# Patient Record
Sex: MALE | Race: WHITE | NOT HISPANIC OR LATINO | Employment: FULL TIME | URBAN - NONMETROPOLITAN AREA
[De-identification: names, ages, dates, MRNs, and addresses within clinical notes are randomized per-mention and may not be internally consistent; named-entity substitution may affect disease eponyms.]

---

## 2017-04-06 ENCOUNTER — HOSPITAL ENCOUNTER (OUTPATIENT)
Dept: RADIOLOGY | Facility: OTHER | Age: 37
End: 2017-04-06
Attending: FAMILY MEDICINE

## 2017-04-06 ENCOUNTER — HISTORY (OUTPATIENT)
Dept: FAMILY MEDICINE | Facility: OTHER | Age: 37
End: 2017-04-06

## 2017-04-06 ENCOUNTER — OFFICE VISIT - GICH (OUTPATIENT)
Dept: FAMILY MEDICINE | Facility: OTHER | Age: 37
End: 2017-04-06

## 2017-04-06 DIAGNOSIS — M54.50 LOW BACK PAIN: ICD-10-CM

## 2017-04-06 DIAGNOSIS — G89.29 OTHER CHRONIC PAIN: ICD-10-CM

## 2017-04-06 DIAGNOSIS — M62.830 MUSCLE SPASM OF BACK: ICD-10-CM

## 2017-04-06 LAB
ABSOLUTE BASOPHILS - HISTORICAL: 0.1 THOU/CU MM
ABSOLUTE EOSINOPHILS - HISTORICAL: 0.1 THOU/CU MM
ABSOLUTE LYMPHOCYTES - HISTORICAL: 2.9 THOU/CU MM (ref 0.9–2.9)
ABSOLUTE MONOCYTES - HISTORICAL: 0.6 THOU/CU MM
ABSOLUTE NEUTROPHILS - HISTORICAL: 3.8 THOU/CU MM (ref 1.7–7)
BASOPHILS # BLD AUTO: 1.2 %
EOSINOPHIL NFR BLD AUTO: 1.6 %
ERYTHROCYTE [DISTWIDTH] IN BLOOD BY AUTOMATED COUNT: 11.1 % (ref 11.5–15.5)
ERYTHROCYTE [SEDIMENTATION RATE] IN BLOOD: 2 MM/HR
HCT VFR BLD AUTO: 44.7 % (ref 37–53)
HEMOGLOBIN: 15.5 G/DL (ref 13.5–17.5)
LYMPHOCYTES NFR BLD AUTO: 38.6 % (ref 20–44)
MCH RBC QN AUTO: 33.3 PG (ref 26–34)
MCHC RBC AUTO-ENTMCNC: 34.6 G/DL (ref 32–36)
MCV RBC AUTO: 96 FL (ref 80–100)
MONOCYTES NFR BLD AUTO: 8 %
NEUTROPHILS NFR BLD AUTO: 50.6 % (ref 42–72)
PLATELET # BLD AUTO: 315 THOU/CU MM (ref 140–440)
PMV BLD: 8.3 FL (ref 6.5–11)
RED BLOOD COUNT - HISTORICAL: 4.65 MIL/CU MM (ref 4.3–5.9)
WHITE BLOOD COUNT - HISTORICAL: 7.6 THOU/CU MM (ref 4.5–11)

## 2017-04-10 LAB
ANA INTERPRETATION: POSITIVE
Lab: ABNORMAL
SPECIMEN STATUS - HISTORICAL: ABNORMAL

## 2017-04-19 ENCOUNTER — HOSPITAL ENCOUNTER (OUTPATIENT)
Dept: PHYSICAL THERAPY | Facility: OTHER | Age: 37
Setting detail: THERAPIES SERIES
End: 2017-04-19
Attending: FAMILY MEDICINE

## 2017-04-19 DIAGNOSIS — M54.50 LOW BACK PAIN: ICD-10-CM

## 2017-04-19 DIAGNOSIS — G89.29 OTHER CHRONIC PAIN: ICD-10-CM

## 2017-12-30 NOTE — DISCHARGE SUMMARY
Patient Information     Patient Name MRN Sex Rangel Stanton [id number] Male 1980      Discharge Summaries by Soheila Mcneil PT at 2017 10:29 AM     Author:  Soheila Mcneil PT Service:  (none) Author Type:  PT- Physical Therapist     Filed:  2017 10:32 AM Date of Service:  2017 10:29 AM Status:  Signed     :  Soheila Mcneil PT (PT- Physical Therapist)            [facility]  Outpatient PT -   Discharge Note      Date of Service: 2017   Visit #1   Patient Name: Rangel Florez   YOB: 1980   Referring MD/Provider: Arielle Chen MD  Medical and Treatment Diagnosis: LBP  Treatment Diagnosis:  L LBP with muscle spasm  Insurance: BC  Start of Service: 17  Certification Dates: Start of Service: 17   Medicare/MA Re-Cert Due: 17    Living Situations:  Independent in Living Situation  Preadmission Functional Mobility: Independent  Precautions:  None  Cognition:  Oriented to Person, Place, and Time.    Were cultural / age or other special adaptations needed? No      Patient is a vulnerable adult: No      Patient is aware of diagnosis: Yes      Risks and benefits explained: Yes      Discharge Note:  Patient was doing well with therapy for LBP.  Patient has not come for any further PT due to called to cancel his follow up because he had to work, discussed HEP and LBP was already much improved, we were going to keep chart open in case of exacerbation but he didn't call back so will d/c at this time.  No G-codes were obtained because of no discharge visit.  Plan to discharge from PT at this time.  See below for any further details in note from last vitis on 17.          Subjective      History of Injury/reason for PT: Patient comes in with left sided LBP that only occurs first thing in the morning.  After 5 -10 minutes loosens up.  L muscle feels like a spasm.  Started about a month ago.  Sleeps on his back, discussed  adding a pillow under knees.  Mattress is old, also discussed getting a new bed.  Doesn't bother him if he gets up in the night, just first thing in the morning. Hasn't tried heat or cold with this episode but has in the past with limited help.  Just goes away with getting up and moving.  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  Saw chiro when he lived up in [place] with gentle adjustments it would usually go away.  Dr. Falcon does strong adjustments that have helped but sx return.  Did have sciatic episode in , chiropractor didn't help, got HEP from a PT and continues with extension stretches daily. Pain 3-4/10 discomfort during day no pain.  Stays busy at work and very active with yard work and remodeling his house and his  parent's house currently.     Symptoms at evaluation:  ?   Weakness - first 5-10 min  Pain Rating:    3-4/10  Pain Location:  L LB    Aggravation Factors: first thing in the morning    Easing Factors: moving    Occupation: forester   Regular duties include: walking woods, office, lift/ carry packs     Patient Specific Functional and Pain Scales (PSFS) completed 2017  Clinician Instructions: Complete after the history and before the exam.   Initial Assessment:   We want to know what 3 activities in your life you are unable to perform, or are having the most difficulty performing, as a result of your chief problem. Please list and score at least 3 activities that you are unable to perform, or having the most difficulty performing, because of your chief problem.   Patient Specific Activity Scoring Scheme (score one number for each activity):   Activity Score (0-10)  0= Unable to perform activity  10= Able to perform activity at same level as before injury or problem   1. Pain in morning (3-410) 6-7/10   2.  /10   3.  /10   4.  /10   5.  /10   Totals:  6-710 = 65% Function   and 35% Impairment   Patient verbally states that they understand that the  information they have provided above is current and complete to the best of their knowledge.     Initial Primary G Code and Modifier:    Per the Patient's intake and/or assessment the Primary G Code is: Mobility .   The Patient's Impairment, Limitation or Restriction Modifier would be best described as: CJ - 20% - 40% Impairment.   Goal Primary G Code and Modifier:    The Patient's G Code Goal would be: Mobility    The Patient's Impairment, Limitation or Restriction Modifier goal would be best described as: CI - 1% - 20% Impairment.       Prior Level of Function: No difficulty completing the above functional activities prior to onset.      Previous Injury / Surgery: none    Previous Treatment:    Pain Meds / Anti-inflammatory Meds    ? - m relaxors no help, IBP some  Chiropractic  Physical Therapy     Diagnostics:       X-Ray- at [facility]      MRI- yes- in 05 or 06 in [place]        Results: negative    Current Medications:   ? See chart of medications    Drug Allergies:  ? See chart for allergies  ?   Latex Allergy: See chart for allergies     No past medical history on file.  No past surgical history on file.    Additional Significant PMHX: Pacemaker no, DM no, healthy    Other: L sciatica    Objective    Items left blank indicate that the test was inappropriate or not meaningful at the time of evaluation and therefore not performed.    Fall Risk Screening:  No risk factors identified       Posture/Structural:   Hand Dominance:   __x___Right  _____Left   Head and Neck Position: fwd   Shoulders/scapulae: fwd   Lumbar lordosis: decreased    Gait: normal    ROM/Strength: Grade 5 equals normal   Cerv Thor Lumbar Myotomes    L    R     L    R   Flexion    C4 Shoulder Elev.   L2 Hip Flexion 5 5   Extension    C5 Shoulder Abd.   L3 Knee Ext.      5 5   Left Lat. Flex    C6 Elbow Flexion   L4 Ankle DF 5 5   Right Lat. Flex    C7 Elbow Ext.   L5 1st MTP Ext.     Left Rotation    C8 Finger Flex   S1 Ankle P.F.      Right Rotation    T1 Finger Abd.   S2 Knee Flexion 5 5          Hip Abduction 5 5          Hip Adduction 5 5          Hip Extension 5 5          Hip ER         Special Tests: SLR negative for nerve involvement but indicates very tight hamstrings B    Palpation: muscle tightness/spasm L lumbosacral paraspinals in standing and sitting, relaxes in prone    Today's Intervention:  Evaluation completed, started patient on HEP as following, educated on posture.     Home Exercise Program:  Access Code: [email or web address] URL: [email or web address] Date: 04/19/2017 Prepared by: Soheila Mcneil   Exercises   Seated Hamstring Stretch - 2 reps - 30 second hold - 2x daily   Standing Lumbar Extension - 5-10 reps - 2 hold - 2x daily   Supine Posterior Pelvic Tilt - 20 reps - 5 hold - 1x daily   Supine Pelvic Tilt with Straight Leg Raise - 20 reps - 1x daily   Swimming - 30 reps - 2 hold - 1x daily - start with just legs            Assessment    Response to Intervention:  Patient was able to demonstrate HEP properly today.  Patient felt he had a good workout without increased pain.      Therapist Assessment / Clinical Impression: Patient presents with signs and symptoms congruent with muscle strain with postural limitations and will benefit from skilled PT to increase ROM, strength, decrease pain, and improve function.      Functional Impairment(s):  See subjective on initial evaluation and Functional Assessment / Summary Report from PSFS.    Physical Impairment(s):       MUSCULOSKELETAL:  Loss of Motion, Muscle Tightness and Pain      Patient Goal (time reference required): Patient would like to be able to get up in the morning without pain less in 4 weeks.     Functional therapy goals:   Patient is to be independent in their Home Exercise Program in 3 weeks.  Patient is to demonstrate ability to sit and stand demonstrating good posture in 3 weeks.  Patient is report the ability to get up from sleep without pain in 6  weeks.  Patient is to self-manage symptoms and return to prior function in 6 weeks.      Patient participated in goal selection and understand(s) the plan of care: Yes   Patient Potential for Achieving Desired Outcome:  Excellent      Plan    Treatment Plan / Targeted Outcomes:     Frequency:   6 visits     Duration of Treatment: 6 weeks    Planned Interventions:  Possible physical therapy interventions include but are not limited to:   Home Exercise Program development  Therapeutic Exercise (ROM & Strengthening)  Manual Therapy  Neuromuscular Re-education  Ultrasound  Electrical Stimulation  Phonophoresis with Ketoprofen  Iontophoresis with Dexamethasone  Therapeutic Activities  Gait Training  Posture and Body Mechanics Education  Mechanical traction if indicated    Plan for next visit:  Advance core strength, add stretches as needed, modalities if needed.     Student or PTA has been instructed in and demonstrates skills necessary to carry out above stated treatment plan: Yes    Thank you for your referral to [facility].  Please call with any questions, concerns or comments.  [phone]  Soheila Mcneil DPT    The signature, of the referring medical provider, on this document indicates certification of the above prescribed plan of care and is medically necessary.

## 2018-01-04 NOTE — PROGRESS NOTES
"Patient Information     Patient Name MRN Sex Rangel Stanton [id number] Male 1980      Progress Notes by Arielle Chen DO at 2017  2:30 PM     Author:  Arielle Chen DO Service:  (none) Author Type:  PHYS- Osteopathic     Filed:  2017  9:27 AM Encounter Date:  2017 Status:  Signed     :  Arielle Chen DO (PHYS- Osteopathic)            SUBJECTIVE:  Rangel Florez is a 36 y.o. male who presents for recurrent back pain.    HPI  [place] is here for low L sided back pain without radiation.  Described as \"tight\" like a spasm.  Worse in the morning when he first wakes up - lasting for 30 minutes.  He can almost time it.  Okay throughout the day at work, but then after any periods of rest or even after stopping work for 10-15 minutes, he will feel it start in again.  Has old muscle relaxers from last summer and tried a couple at bedtime - only noticed a small improvement.  Works as a [employer].  + tobacco use - aware of need to quit. And reiterated today body's ability to heal and recover is blunted when tobacco is present.  Uncertain if there is any family history of autoimmune diseases or inflammatory arthritis.    No Known Allergies,   Current Outpatient Prescriptions on File Prior to Visit       Medication  Sig Dispense Refill     meloxicam 15 mg tablet Take 1 tablet by mouth once daily. 14 tablet 0     No current facility-administered medications on file prior to visit.      Patient Active Problem List     Diagnosis  Code     Chronic left-sided low back pain without sciatica M54.5, G89.29     REVIEW OF SYSTEMS:  Review of Systems   Constitutional: Negative for chills and fever.   Respiratory: Negative for cough and shortness of breath.    Skin: Negative for rash.   Neurological: Negative for tingling, tremors, sensory change and focal weakness.     OBJECTIVE:  /96  Pulse 68  Wt 82.8 kg (182 lb 9.6 oz)  BMI 24.09 kg/m2    EXAM:   Physical Exam   Constitutional: He is " well-developed, well-nourished, and in no distress.   HENT:   Head: Normocephalic and atraumatic.   Right Ear: External ear normal.   Left Ear: External ear normal.   Nose: Nose normal.   Mouth/Throat: Oropharynx is clear and moist.   Musculoskeletal:        Right shoulder: He exhibits spasm (b/l lower back; L worse than R.).   Moves easily in exam room; full ROM of back.   Vitals reviewed.    Xray: some narrowing at L5-S1; no acute dislocations or malalignment.  Radiology read: mild degenerative disease    ASSESSMENT/PLAN:    ICD-10-CM    1. Chronic left-sided low back pain without sciatica M54.5 XR SPINE LUMBAR 3 VIEWS     G89.29 CBC WITH DIFFERENTIAL      SEDIMENTATION RATE      PRESTON TEST      AMB CONSULT TO PHYSICAL THERAPY      CBC WITH DIFFERENTIAL      SEDIMENTATION RATE      PRESTON TEST      CBC WITH AUTO DIFFERENTIAL   2. Back muscle spasm M62.830 cyclobenzaprine (FLEXERIL) 10 mg tablet        Plan:   Encouraged activity modification slightly when able to relieve pressure on back.  Encouraged use of NSAIDs prior to activity.  Will obtain an ASA, ESR and CBC to screen for RA due to stiffness symptoms in the morning prior to movement.  Patient open to PT as well; referral placed to help with symptoms.    Follow up pending above.

## 2018-01-04 NOTE — INITIAL ASSESSMENTS
Patient Information     Patient Name MRN Sex Rangel Stanton [id number] Male 1980      Initial Assessments by Soheila Mcneil PT at 2017  8:01 AM     Author:  Soheila Mcneil PT Service:  (none) Author Type:  PT- Physical Therapist     Filed:  2017 10:02 AM Date of Service:  2017  8:01 AM Status:  Signed     :  Soheila Mcneil PT (PT- Physical Therapist)            [facility]  Outpatient PT - Initial Evaluation  Spine Evaluation         Date of Service: 2017   Visit #1   Patient Name: Rangel Florez   YOB: 1980   Referring MD/Provider: Arielle Chen MD  Medical and Treatment Diagnosis: LBP  Treatment Diagnosis:  L LBP with muscle spasm  Insurance: [facility]  Start of Service: 17  Certification Dates: Start of Service: 17   Medicare/MA Re-Cert Due: 17    Living Situations:  Independent in Living Situation  Preadmission Functional Mobility: Independent  Precautions:  None  Cognition:  Oriented to Person, Place, and Time.    Were cultural / age or other special adaptations needed? No      Patient is a vulnerable adult: No      Patient is aware of diagnosis: Yes      Risks and benefits explained: Yes    Subjective      History of Injury/reason for PT: Patient comes in with left sided LBP that only occurs first thing in the morning.  After 5 -10 minutes loosens up.  L muscle feels like a spasm.  Started about a month ago.  Sleeps on his back, discussed adding a pillow under knees.  Mattress is old, also discussed getting a new bed.  Doesn't bother him if he gets up in the night, just first thing in the morning. Hasn't tried heat or cold with this episode but has in the past with limited help.  Just goes away with getting up and moving.  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  Saw chiro when he lived up in [place] with gentle adjustments it would usually go away.  Dr. Falcon does strong adjustments  that have helped but sx return.  Did have sciatic episode in 2015, chiropractor didn't help, got HEP from a PT and continues with extension stretches daily. Pain 3-4/10 discomfort during day no pain.  Stays busy at work and very active with yard work and remodeling his house and his  parent's house currently.     Symptoms at evaluation:  ?   Weakness - first 5-10 min  Pain Rating:    3-4/10  Pain Location:  L LB    Aggravation Factors: first thing in the morning    Easing Factors: moving    Occupation: forester   Regular duties include: walking woods, office, lift/ carry packs     Patient Specific Functional and Pain Scales (PSFS) completed 2017  Clinician Instructions: Complete after the history and before the exam.   Initial Assessment:   We want to know what 3 activities in your life you are unable to perform, or are having the most difficulty performing, as a result of your chief problem. Please list and score at least 3 activities that you are unable to perform, or having the most difficulty performing, because of your chief problem.   Patient Specific Activity Scoring Scheme (score one number for each activity):   Activity Score (0-10)  0= Unable to perform activity  10= Able to perform activity at same level as before injury or problem   1. Pain in morning (3-4/10) 6-7/10   2.  /10   3.  /10   4.  /10   5.  /10   Totals:  6-7/10 = 65% Function   and 35% Impairment   Patient verbally states that they understand that the information they have provided above is current and complete to the best of their knowledge.     Initial Primary G Code and Modifier:    Per the Patient's intake and/or assessment the Primary G Code is: Mobility .   The Patient's Impairment, Limitation or Restriction Modifier would be best described as: CJ - 20% - 40% Impairment.   Goal Primary G Code and Modifier:    The Patient's G Code Goal would be: Mobility    The Patient's Impairment, Limitation or Restriction  Modifier goal would be best described as: CI - 1% - 20% Impairment.       Prior Level of Function: No difficulty completing the above functional activities prior to onset.      Previous Injury / Surgery: none    Previous Treatment:    Pain Meds / Anti-inflammatory Meds    ? - m relaxors no help, IBP some  Chiropractic  Physical Therapy     Diagnostics:       X-Ray- at chiro      MRI- yes- in 05 or 06 in [place]        Results: negative    Current Medications:   ? See chart of medications    Drug Allergies:  ? See chart for allergies  ?   Latex Allergy: See chart for allergies     No past medical history on file.  No past surgical history on file.    Additional Significant PMHX: Pacemaker no, DM no, healthy    Other: L sciatica    Objective    Items left blank indicate that the test was inappropriate or not meaningful at the time of evaluation and therefore not performed.    Fall Risk Screening:  No risk factors identified       Posture/Structural:   Hand Dominance:   __x___Right  _____Left   Head and Neck Position: fwd   Shoulders/scapulae: fwd   Lumbar lordosis: decreased    Gait: normal    ROM/Strength: Grade 5 equals normal   Cerv Thor Lumbar Myotomes    L    R     L    R   Flexion    C4 Shoulder Elev.   L2 Hip Flexion 5 5   Extension    C5 Shoulder Abd.   L3 Knee Ext.      5 5   Left Lat. Flex    C6 Elbow Flexion   L4 Ankle DF 5 5   Right Lat. Flex    C7 Elbow Ext.   L5 1st MTP Ext.     Left Rotation    C8 Finger Flex   S1 Ankle P.F.     Right Rotation    T1 Finger Abd.   S2 Knee Flexion 5 5          Hip Abduction 5 5          Hip Adduction 5 5          Hip Extension 5 5          Hip ER         Special Tests: SLR negative for nerve involvement but indicates very tight hamstrings B    Palpation: muscle tightness/spasm L lumbosacral paraspinals in standing and sitting, relaxes in prone    Today's Intervention:  Evaluation completed, started patient on HEP as following, educated on posture.     Home Exercise  Program:  Access Code: [email or web address] URL: [email or web address] Date: 04/19/2017 Prepared by: Soheila Mcneil   Exercises   Seated Hamstring Stretch - 2 reps - 30 second hold - 2x daily   Standing Lumbar Extension - 5-10 reps - 2 hold - 2x daily   Supine Posterior Pelvic Tilt - 20 reps - 5 hold - 1x daily   Supine Pelvic Tilt with Straight Leg Raise - 20 reps - 1x daily   Swimming - 30 reps - 2 hold - 1x daily - start with just legs            Assessment    Response to Intervention:  Patient was able to demonstrate HEP properly today.  Patient felt he had a good workout without increased pain.      Therapist Assessment / Clinical Impression: Patient presents with signs and symptoms congruent with muscle strain with postural limitations and will benefit from skilled PT to increase ROM, strength, decrease pain, and improve function.      Functional Impairment(s):  See subjective on initial evaluation and Functional Assessment / Summary Report from PSFS.    Physical Impairment(s):       MUSCULOSKELETAL:  Loss of Motion, Muscle Tightness and Pain      Patient Goal (time reference required): Patient would like to be able to get up in the morning without pain less in 4 weeks.     Functional therapy goals:   Patient is to be independent in their Home Exercise Program in 3 weeks.  Patient is to demonstrate ability to sit and stand demonstrating good posture in 3 weeks.  Patient is report the ability to get up from sleep without pain in 6 weeks.  Patient is to self-manage symptoms and return to prior function in 6 weeks.      Patient participated in goal selection and understand(s) the plan of care: Yes   Patient Potential for Achieving Desired Outcome:  Excellent      Plan    Treatment Plan / Targeted Outcomes:     Frequency:   6 visits     Duration of Treatment: 6 weeks    Planned Interventions:  Possible physical therapy interventions include but are not limited to:   Home Exercise Program development  Therapeutic  Exercise (ROM & Strengthening)  Manual Therapy  Neuromuscular Re-education  Ultrasound  Electrical Stimulation  Phonophoresis with Ketoprofen  Iontophoresis with Dexamethasone  Therapeutic Activities  Gait Training  Posture and Body Mechanics Education  Mechanical traction if indicated    Plan for next visit:  Advance core strength, add stretches as needed, modalities if needed.     Student or PTA has been instructed in and demonstrates skills necessary to carry out above stated treatment plan: Yes    Thank you for your referral to [facility].  Please call with any questions, concerns or comments.  [phone]  Soheila Mcneil DPT    The signature, of the referring medical provider, on this document indicates certification of the above prescribed plan of care and is medically necessary.

## 2018-01-26 VITALS — SYSTOLIC BLOOD PRESSURE: 140 MMHG | DIASTOLIC BLOOD PRESSURE: 96 MMHG | HEART RATE: 68 BPM | WEIGHT: 182.6 LBS

## 2018-03-02 ENCOUNTER — DOCUMENTATION ONLY (OUTPATIENT)
Dept: FAMILY MEDICINE | Facility: OTHER | Age: 38
End: 2018-03-02

## 2018-03-02 PROBLEM — G89.29 CHRONIC LEFT-SIDED LOW BACK PAIN WITHOUT SCIATICA: Status: ACTIVE | Noted: 2017-04-06

## 2018-03-02 PROBLEM — M54.50 CHRONIC LEFT-SIDED LOW BACK PAIN WITHOUT SCIATICA: Status: ACTIVE | Noted: 2017-04-06

## 2018-03-02 RX ORDER — CYCLOBENZAPRINE HCL 10 MG
10 TABLET ORAL
COMMUNITY
Start: 2017-04-06 | End: 2018-12-21

## 2018-03-02 RX ORDER — MELOXICAM 15 MG/1
15 TABLET ORAL
COMMUNITY
Start: 2016-07-21 | End: 2018-12-21

## 2018-06-14 ENCOUNTER — OFFICE VISIT (OUTPATIENT)
Dept: FAMILY MEDICINE | Facility: OTHER | Age: 38
End: 2018-06-14
Attending: NURSE PRACTITIONER
Payer: OTHER MISCELLANEOUS

## 2018-06-14 VITALS
SYSTOLIC BLOOD PRESSURE: 126 MMHG | DIASTOLIC BLOOD PRESSURE: 88 MMHG | HEART RATE: 91 BPM | OXYGEN SATURATION: 94 % | WEIGHT: 178.9 LBS | TEMPERATURE: 98.8 F | HEIGHT: 73 IN | BODY MASS INDEX: 23.71 KG/M2
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
## 2018-06-14 DIAGNOSIS — S30.863A TICK BITE OF SCROTUM, INITIAL ENCOUNTER: Primary | ICD-10-CM

## 2018-06-14 PROCEDURE — 99213 OFFICE O/P EST LOW 20 MIN: CPT | Performed by: NURSE PRACTITIONER

## 2018-06-14 RX ORDER — DOXYCYCLINE 100 MG/1
200 CAPSULE ORAL ONCE
Qty: 2 CAPSULE | Refills: 0 | Status: SHIPPED | OUTPATIENT
Start: 2018-06-14 | End: 2018-06-14

## 2018-06-14 ASSESSMENT — ENCOUNTER SYMPTOMS: FEVER: 0

## 2018-06-14 NOTE — MR AVS SNAPSHOT
"              After Visit Summary   6/14/2018    Rangel Florez    MRN: [id number]           Patient Information     Date Of Birth          1980        Visit Information        Provider Department      6/14/2018 6:45 PM Nancy Lauren APRN CNP [facility]        Today's Diagnoses     Tick bite of scrotum, initial encounter    -  1      Care Instructions      Tick Bites  Ticks are small arachnids that feed on the blood of rodents, rabbits, birds, deer, dogs, and people. A tick bite may cause a reaction like a spider bite. You may have redness, itching, and slight swelling at the site. Sometimes you may have no reaction where the tick bit you.  Ticks may gorge themselves for days before you find and remove them. The bites themselves aren't cause for concern. But ticks can carry and pass on illnesses such as Lyme disease and [place] spotted fever. Both diseases begin with a rash and symptoms similar to the flu. In advanced stages, these diseases can be quite serious.     A \"bull's eye\" rash is a common symptom of Lyme disease.   When to go to the emergency room (ER)  Not all ticks carry disease. And a tick must stay attached for at least 24 hours to infect you. If you find a tick, don't panic. Try to carefully remove it with tweezers. Grasp the tick near its head and pull without twisting. If you can't easily dislodge the tick or if you leave the head in your skin, get medical care right away.  What to expect in the ER    The tick or any parts of the tick will be removed and the bite will be cleaned.    To prevent disease, you may be given antibiotics. Both Lyme disease and [place] spotted fever respond quickly to these medicines.    You may be asked to see your healthcare provider for a blood test to check for Lyme disease.  Follow-up care  Some states and counties have services that test ticks for Lyme disease and other diseases. Check with your local officials to " "see if this service is available in your area.  If you remove a tick yourself, watch for signs of a tick-borne illness. Symptoms may show up within a few days or weeks after a bite. Call your healthcare provider if you notice any of the following:    Rash. The rash may spread outward in a ring from a hard white lump. Or it may move up your arms and legs to your chest.    Chills and fever    Body aches and joint pain    Severe headache  Date Last Reviewed: 12/1/2016 2000-2017 [employer]. [address] [place] [address]. All rights reserved. This information is not intended as a substitute for professional medical care. Always follow your healthcare professional's instructions.                Follow-ups after your visit        Who to contact     If you have questions or need follow up information about today's clinic visit or your schedule please contact [facility] directly at [phone].  Normal or non-critical lab and imaging results will be communicated to you by [employer], letter or phone within 4 business days after the clinic has received the results. If you do not hear from us within 7 days, please contact the clinic through [employer] or phone. If you have a critical or abnormal lab result, we will notify you by phone as soon as possible.  Submit refill requests through [employer] or call your pharmacy and they will forward the refill request to us. Please allow 3 business days for your refill to be completed.          Additional Information About Your Visit        [employer] Information     [employer] lets you send messages to your doctor, view your test results, renew your prescriptions, schedule appointments and more. To sign up, go to [email or web address] . Click on \"Log in\" on the left side of the screen, which will take you to the Welcome page. Then click on \"Sign up Now\" on the right side of the page.     You will be asked to enter the access code listed below, " "as well as some personal information. Please follow the directions to create your username and password.     Your access code is: [email or web address]  Expires: 2018  6:46 PM     Your access code will  in 90 days. If you need help or a new code, please call your [facility] clinic or [phone].        Care EveryWhere ID     This is your Care EveryWhere ID. This could be used by other organizations to access your [facility] medical records  [id number]        Your Vitals Were     Pulse Temperature Height Pulse Oximetry BMI (Body Mass Index)       91 98.8  F (37.1  C) (Tympanic) 6' 1\" (1.854 m) 94% 23.6 kg/m2        Blood Pressure from Last 3 Encounters:   18 126/88   17 (!) 140/96   16 138/70    Weight from Last 3 Encounters:   18 178 lb 14.4 oz (81.1 kg)   17 182 lb 9.6 oz (82.8 kg)   16 178 lb 6.4 oz (80.9 kg)              Today, you had the following     No orders found for display         Today's Medication Changes          These changes are accurate as of 18  7:18 PM.  If you have any questions, ask your nurse or doctor.               Start taking these medicines.        Dose/Directions    doxycycline 100 MG capsule   Commonly known as:  VIBRAMYCIN   Used for:  Tick bite of scrotum, initial encounter   Started by:  Nancy Lauren APRN CNP        Dose:  200 mg   Take 2 capsules (200 mg) by mouth once for 1 dose   Quantity:  2 capsule   Refills:  0            Where to get your medicines      These medications were sent to [id number] - [place], [place] - [address]  [address], [facility] [address]     Phone:  [phone]     doxycycline 100 MG capsule                Primary Care Provider Office Phone # Fax #    Arielle LUKE Chen [phone]       [address] [facility] [address]        Equal Access to Services     ZEE CANTU AH: shara Bueno, " lissa kohliemileejeimy roserosalba annain hayaan adeeg kharash la'aan ah. So [facility] [phone].    ATENCIÓN: Si yasmany rosenberg, tiene a bunch disposición servicios gratuitos de asistencia lingüística. Mary al [phone].    We comply with applicable federal civil rights laws and [place] laws. We do not discriminate on the basis of race, color, national origin, age, disability, sex, sexual orientation, or gender identity.            Thank you!     Thank you for choosing [facility]  for your care. Our goal is always to provide you with excellent care. Hearing back from our patients is one way we can continue to improve our services. Please take a few minutes to complete the written survey that you may receive in the mail after your visit with us. Thank you!             Your Updated Medication List - Protect others around you: Learn how to safely use, store and throw away your medicines at www.disposemymeds.org.          This list is accurate as of 6/14/18  7:18 PM.  Always use your most recent med list.                   Brand Name Dispense Instructions for use Diagnosis    cyclobenzaprine 10 MG tablet    FLEXERIL     Take 10 mg by mouth        doxycycline 100 MG capsule    VIBRAMYCIN    2 capsule    Take 2 capsules (200 mg) by mouth once for 1 dose    Tick bite of scrotum, initial encounter       meloxicam 15 MG tablet    MOBIC     Take 15 mg by mouth

## 2018-06-14 NOTE — NURSING NOTE
Patient present to clinic today with a deer tick bite in his scrotum. Patient noticed it today. He states that the tick is almost completely embedded.     Dee Saunders CMA..............6/14/2018........6:58 PM

## 2018-06-15 NOTE — PROGRESS NOTES
HPI Comments: Nursing Notes:   Dee Saunders CMA  6/14/2018  6:59 PM  Unsigned  Patient present to clinic today with a deer tick bite in his scrotum.   Patient noticed it today. He states that the tick is almost completely   embedded.     Dee Saunders CMA..............6/14/2018........6:58 PM    Deer tick on left scrotum-noticed tonight. The tick is dead and body fell apart. Unsure how long tick was attached. Thinks it looks infected. Denies fever, body aches or joint pain. Attempted to remove-half of body is still in there.         Review of Systems   Constitutional: Negative for fever.   Skin: Negative for rash.         Physical Exam   Constitutional: He is well-developed, well-nourished, and in no distress.   Skin: Skin is warm and dry.   Psychiatric: Affect normal.       Assessment: Deer tick embedded in left testicle, attempted to remove with tick eusebio without success, removed with forceps with good success    Treat with Doxycycline 200 mgs PO once  Follow up if fevers, body aches, joint pain develop

## 2018-07-23 NOTE — PROGRESS NOTES
Patient Information     Patient Name  Rangel Florez MRN  [id number] Sex  Male   1980      Letter by Arielle Chen DO at      Author:  Arielle Chen DO Service:  (none) Author Type:  (none)    Filed:   Encounter Date:  2017 Status:  (Other)           Rangel Florez  [address] [facility] [address]          May 9, 2017    Dear Mr. Florez:    Following are the tests completed during your last clinic visit.  The results of these tests are normal and require no further attention unless otherwise noted.    I am just writing to say that I hope Physical Therapy is improving some of your back symptoms.  Your PRESTON (antinuclear Antibody) returned very mildly positive.  This can be a marker of autoimmune disease, but it could be positive with nothing else wrong.  We will continue to follow your back symptoms for the time being and see what transpires.    Results for orders placed or performed in visit on 17      SEDIMENTATION RATE      Result  Value Ref Range    SEDIMENTATION RATE        2 <16 mm/hr   PRESTON TEST      Result  Value Ref Range    ANTINUCLEAR ANTIBODY (PRESTON),SCREEN Positive (A) Negative    TITER,PATTERN/INTERP Nucleolar 1:160 (A) (none)    SPECIMEN STATUS Serum will be saved for 21 Days    CBC WITH AUTO DIFFERENTIAL      Result  Value Ref Range    WHITE BLOOD COUNT         7.6 4.5 - 11.0 thou/cu mm    RED BLOOD COUNT           4.65 4.30 - 5.90 mil/cu mm    HEMOGLOBIN                15.5 13.5 - 17.5 g/dL    HEMATOCRIT                44.7 37.0 - 53.0 %    MCV                       96 80 - 100 fL    MCH                       33.3 26.0 - 34.0 pg    MCHC                      34.6 32.0 - 36.0 g/dL    RDW                       11.1 (L) 11.5 - 15.5 %    PLATELET COUNT            315 140 - 440 thou/cu mm    MPV                       8.3 6.5 - 11.0 fL    NEUTROPHILS               50.6 42.0 - 72.0 %    LYMPHOCYTES               38.6 20.0 - 44.0 %    MONOCYTES                 8.0  <12.0 %    EOSINOPHILS               1.6 <8.0 %    BASOPHILS                 1.2 <3.0 %    ABSOLUTE NEUTROPHILS      3.8 1.7 - 7.0 thou/cu mm    ABSOLUTE LYMPHOCYTES      2.9 0.9 - 2.9 thou/cu mm    ABSOLUTE MONOCYTES        0.6 <0.9 thou/cu mm    ABSOLUTE EOSINOPHILS      0.1 <0.5 thou/cu mm    ABSOLUTE BASOPHILS        0.1 <0.3 thou/cu mm       If you have any further questions or problems contact my office at  [phone].    Thank you,    JOSUÉ BAUTISTA, DO

## 2018-12-21 ENCOUNTER — OFFICE VISIT (OUTPATIENT)
Dept: FAMILY MEDICINE | Facility: OTHER | Age: 38
End: 2018-12-21
Attending: FAMILY MEDICINE
Payer: COMMERCIAL

## 2018-12-21 VITALS
DIASTOLIC BLOOD PRESSURE: 68 MMHG | BODY MASS INDEX: 24.07 KG/M2 | WEIGHT: 181.6 LBS | HEIGHT: 73 IN | HEART RATE: 56 BPM | SYSTOLIC BLOOD PRESSURE: 120 MMHG

## 2018-12-21 DIAGNOSIS — Z00.00 ROUTINE HISTORY AND PHYSICAL EXAMINATION OF ADULT: Primary | ICD-10-CM

## 2018-12-21 DIAGNOSIS — Z72.0 TOBACCO ABUSE: ICD-10-CM

## 2018-12-21 LAB
CHOLEST SERPL-MCNC: 230 MG/DL
HDLC SERPL-MCNC: 45 MG/DL (ref 23–92)
LDLC SERPL CALC-MCNC: 163 MG/DL
NONHDLC SERPL-MCNC: 185 MG/DL
TRIGL SERPL-MCNC: 109 MG/DL

## 2018-12-21 PROCEDURE — 90715 TDAP VACCINE 7 YRS/> IM: CPT | Performed by: FAMILY MEDICINE

## 2018-12-21 PROCEDURE — 96372 THER/PROPH/DIAG INJ SC/IM: CPT

## 2018-12-21 PROCEDURE — 99385 PREV VISIT NEW AGE 18-39: CPT | Mod: 25 | Performed by: FAMILY MEDICINE

## 2018-12-21 PROCEDURE — 80061 LIPID PANEL: CPT | Performed by: FAMILY MEDICINE

## 2018-12-21 PROCEDURE — 36415 COLL VENOUS BLD VENIPUNCTURE: CPT | Performed by: FAMILY MEDICINE

## 2018-12-21 PROCEDURE — 90471 IMMUNIZATION ADMIN: CPT | Performed by: FAMILY MEDICINE

## 2018-12-21 ASSESSMENT — ANXIETY QUESTIONNAIRES
7. FEELING AFRAID AS IF SOMETHING AWFUL MIGHT HAPPEN: NOT AT ALL
6. BECOMING EASILY ANNOYED OR IRRITABLE: NOT AT ALL
2. NOT BEING ABLE TO STOP OR CONTROL WORRYING: NOT AT ALL
GAD7 TOTAL SCORE: 0
5. BEING SO RESTLESS THAT IT IS HARD TO SIT STILL: NOT AT ALL
3. WORRYING TOO MUCH ABOUT DIFFERENT THINGS: NOT AT ALL
1. FEELING NERVOUS, ANXIOUS, OR ON EDGE: NOT AT ALL

## 2018-12-21 ASSESSMENT — PAIN SCALES - GENERAL: PAINLEVEL: NO PAIN (0)

## 2018-12-21 ASSESSMENT — PATIENT HEALTH QUESTIONNAIRE - PHQ9
SUM OF ALL RESPONSES TO PHQ QUESTIONS 1-9: 0
5. POOR APPETITE OR OVEREATING: NOT AT ALL

## 2018-12-21 ASSESSMENT — MIFFLIN-ST. JEOR: SCORE: 1794.67

## 2018-12-21 NOTE — PROGRESS NOTES
"  SUBJECTIVE:   Rangel Florez is a 38 year old male who presents to clinic today for the following health issues: Physical    Patient arrives here for physical.  He also has concerns about left knee pain right shoulder pain.  He is concerned he might have Lyme arthritis.  Reports a deer tick bite back in June.  He was treated with a single dose of doxycycline.  A month or 2 later he developed some left knee pain and then shortly after that right shoulder pain.  Shoulder pain is located in the chest.  He does take over-the-counter medications which seems to help on a short-term basis.  He reports no swelling in the knee.  No symptoms of fevers chills or flulike symptoms in the summer    The ASCVD Risk score (Keyana MARIE Jr., et al., 2013) failed to calculate for the following reasons:    The 2013 ASCVD risk score is only valid for ages 40 to 79            Patient Active Problem List    Diagnosis Date Noted     Tobacco abuse 12/21/2018     Priority: Medium     Routine history and physical examination of adult 12/21/2018     Priority: Medium     Chronic left-sided low back pain without sciatica 04/06/2017     Priority: Medium     No past medical history on file.   Social History     Social History Narrative    Sig other  [place] DNR    paul no children     No current outpatient medications on file.     No Known Allergies    Review of Systems     OBJECTIVE:     /68   Pulse 56   Ht 1.842 m (6' 0.5\")   Wt 82.4 kg (181 lb 9.6 oz)   BMI 24.29 kg/m    Body mass index is 24.29 kg/m .  Physical Exam   Constitutional: He appears well-developed.   HENT:   Head: Normocephalic.   Eyes: Pupils are equal, round, and reactive to light.   Cardiovascular: Normal rate and regular rhythm.   Musculoskeletal: Normal range of motion.   Ligaments are intact involving the left knee.  He has good range of motion to his shoulder.  No joint effusion present.   Neurological: He is alert.   Skin: Skin is warm.       Diagnostic Test " Results:  Results for orders placed or performed in visit on 12/21/18   Lipid Panel   Result Value Ref Range    Cholesterol 230 (H) <200 mg/dL    Triglycerides 109 <150 mg/dL    HDL Cholesterol 45 23 - 92 mg/dL    LDL Cholesterol Calculated 163 (H) <100 mg/dL    Non HDL Cholesterol 185 (H) <130 mg/dL     The ASCVD Risk score (Keyana MARIE Jr., et al., 2013) failed to calculate for the following reasons:    The 2013 ASCVD risk score is only valid for ages 40 to 79    ASSESSMENT/PLAN:         1. Tobacco abuse      2. Routine history and physical examination of adult    - Lipid Panel; Future  - Lipid Panel  Also discussed left knee pain and shoulder pain.  Likely does not result represent Lyme arthritis.  We discussed options.  He continue the over-the-counter medications if worsening follow-up for a focused exam.  Bello Schmitt MD letter will be dictated concerning his labs  [facility]

## 2018-12-21 NOTE — NURSING NOTE
Prior to injection, verified patient identity using patient's name and date of birth.  Due to injection administration, patient instructed to remain in clinic for 15 minutes  afterwards, and to report any adverse reaction to me immediately.    Mavis Black LPN  12/21/2018 11:27 AM

## 2018-12-21 NOTE — LETTER
December 24, 2018      Rangel Florez  [address] [facility] [address]        Dear ,    We are writing to inform you of your test results.    Your cholesterol is slightly elevated and I would recommend improving her diet and activity.  Ideally if we could draw this in 3-6 months and see if you have improved.  You may be a candidate for a cholesterol-lowering medication if you do not improve.    Resulted Orders   Lipid Panel   Result Value Ref Range    Cholesterol 230 (H) <200 mg/dL    Triglycerides 109 <150 mg/dL    HDL Cholesterol 45 23 - 92 mg/dL    LDL Cholesterol Calculated 163 (H) <100 mg/dL      Comment:      Above desirable:  100-129 mg/dl  Borderline High:  130-159 mg/dL  High:             160-189 mg/dL  Very high:       >189 mg/dl      Non HDL Cholesterol 185 (H) <130 mg/dL      Comment:      Above Desirable:  130-159 mg/dl  Borderline high:  160-189 mg/dl  High:             190-219 mg/dl  Very high:       >219 mg/dl         If you have any questions or concerns, please call the clinic at the number listed above.       Sincerely,        Bello Schmitt MD

## 2018-12-22 ASSESSMENT — ANXIETY QUESTIONNAIRES: GAD7 TOTAL SCORE: 0

## 2019-02-28 ENCOUNTER — OFFICE VISIT (OUTPATIENT)
Dept: FAMILY MEDICINE | Facility: OTHER | Age: 39
End: 2019-02-28
Attending: NURSE PRACTITIONER
Payer: COMMERCIAL

## 2019-02-28 VITALS
TEMPERATURE: 98.1 F | HEART RATE: 73 BPM | OXYGEN SATURATION: 95 % | WEIGHT: 187.3 LBS | RESPIRATION RATE: 16 BRPM | BODY MASS INDEX: 25.05 KG/M2

## 2019-02-28 DIAGNOSIS — M25.50 PAIN IN JOINT: Primary | ICD-10-CM

## 2019-02-28 NOTE — NURSING NOTE
Chief Complaint   Patient presents with     Joint Pain       Medication Reconciliation: complete   Patient presents with swollen elbow for 1 1/2 months. Patient would also like to be treated for lyme disease from a tick bite last summer. Patient was advised that he should make an appointment with PCP. Patient stated he will make a call for one. Janey Jackson LPN .............2/28/2019  2:42 PM

## 2019-03-11 ENCOUNTER — HOSPITAL ENCOUNTER (OUTPATIENT)
Dept: GENERAL RADIOLOGY | Facility: OTHER | Age: 39
Discharge: HOME | End: 2019-03-11
Attending: PHYSICIAN ASSISTANT | Admitting: PHYSICIAN ASSISTANT
Payer: COMMERCIAL

## 2019-03-11 ENCOUNTER — OFFICE VISIT (OUTPATIENT)
Dept: FAMILY MEDICINE | Facility: OTHER | Age: 39
End: 2019-03-11
Attending: PHYSICIAN ASSISTANT
Payer: COMMERCIAL

## 2019-03-11 VITALS
WEIGHT: 187 LBS | SYSTOLIC BLOOD PRESSURE: 132 MMHG | HEART RATE: 60 BPM | DIASTOLIC BLOOD PRESSURE: 70 MMHG | TEMPERATURE: 98.6 F | BODY MASS INDEX: 25.01 KG/M2 | RESPIRATION RATE: 20 BRPM

## 2019-03-11 DIAGNOSIS — M25.522 LEFT ELBOW PAIN: ICD-10-CM

## 2019-03-11 DIAGNOSIS — M70.22 OLECRANON BURSITIS OF LEFT ELBOW: Primary | ICD-10-CM

## 2019-03-11 LAB
BASOPHILS # BLD AUTO: 0.1 10E9/L (ref 0–0.2)
BASOPHILS NFR BLD AUTO: 0.9 %
DIFFERENTIAL METHOD BLD: NORMAL
EOSINOPHIL # BLD AUTO: 0.1 10E9/L (ref 0–0.7)
EOSINOPHIL NFR BLD AUTO: 1.1 %
ERYTHROCYTE [DISTWIDTH] IN BLOOD BY AUTOMATED COUNT: 12.2 % (ref 10–15)
HCT VFR BLD AUTO: 43.3 % (ref 40–53)
HGB BLD-MCNC: 15 G/DL (ref 13.3–17.7)
IMM GRANULOCYTES # BLD: 0 10E9/L (ref 0–0.4)
IMM GRANULOCYTES NFR BLD: 0 %
LYMPHOCYTES # BLD AUTO: 2.3 10E9/L (ref 0.8–5.3)
LYMPHOCYTES NFR BLD AUTO: 43.4 %
MCH RBC QN AUTO: 32.8 PG (ref 26.5–33)
MCHC RBC AUTO-ENTMCNC: 34.6 G/DL (ref 31.5–36.5)
MCV RBC AUTO: 95 FL (ref 78–100)
MONOCYTES # BLD AUTO: 0.8 10E9/L (ref 0–1.3)
MONOCYTES NFR BLD AUTO: 14.6 %
NEUTROPHILS # BLD AUTO: 2.1 10E9/L (ref 1.6–8.3)
NEUTROPHILS NFR BLD AUTO: 40 %
PLATELET # BLD AUTO: 288 10E9/L (ref 150–450)
RBC # BLD AUTO: 4.57 10E12/L (ref 4.4–5.9)
WBC # BLD AUTO: 5.3 10E9/L (ref 4–11)

## 2019-03-11 PROCEDURE — 99213 OFFICE O/P EST LOW 20 MIN: CPT | Performed by: PHYSICIAN ASSISTANT

## 2019-03-11 PROCEDURE — 85025 COMPLETE CBC W/AUTO DIFF WBC: CPT | Performed by: PHYSICIAN ASSISTANT

## 2019-03-11 PROCEDURE — 73080 X-RAY EXAM OF ELBOW: CPT | Mod: LT

## 2019-03-11 PROCEDURE — 36415 COLL VENOUS BLD VENIPUNCTURE: CPT | Performed by: PHYSICIAN ASSISTANT

## 2019-03-11 PROCEDURE — 87798 DETECT AGENT NOS DNA AMP: CPT | Mod: 91 | Performed by: PHYSICIAN ASSISTANT

## 2019-03-11 PROCEDURE — 86618 LYME DISEASE ANTIBODY: CPT | Performed by: PHYSICIAN ASSISTANT

## 2019-03-11 NOTE — PROGRESS NOTES
Nursing Notes:   Luma Oconnor LPN  3/11/2019  3:04 PM  Signed  Chief Complaint   Patient presents with     Elbow Pain         Medication Reconciliation: complete    Luma Oconnor LPN      HPI:    Rangel Florez is a 38 year old male who presents for elbow pain. Swollen x 1.5 months.  No trauma. Pain with resting. Tiny bit red.  Warm.  No fevers. Does rest his left arm on the car while driving. Stable in size. No repetitive movements. Tick bite last summer. Otherwise feeling well. No fatigue, chest pain, palpitations, GI or urinary symptoms.       Past Medical History:   Diagnosis Date     Medical history reviewed with no changes        Past Surgical History:   Procedure Laterality Date     HC TOOTH EXTRACTION W/FORCEP         Family History   Problem Relation Age of Onset     Lung Cancer Father        Social History     Tobacco Use     Smoking status: Current Every Day Smoker     Packs/day: 1.00     Types: Cigarettes     Smokeless tobacco: Never Used   Substance Use Topics     Alcohol use: Yes     Comment: 6 beer a week        No current outpatient medications on file.       No Known Allergies    REVIEW OF SYSTEMS:  Refer to HPI.    EXAM:   Vitals:    /70   Pulse 60   Temp 98.6  F (37  C)   Resp 20   Wt 84.8 kg (187 lb)   BMI 25.01 kg/m      General Appearance: Pleasant, alert, appropriate appearance for age. No acute distress  Musculoskeletal Exam: Minimal pain with left lower arm flexion/extension. Mild effusion appreciated on posterior left elbow with minimal erythema and tenderness with palpation.   Skin: no rash or abnormalities  Neurologic Exam: Nonfocal, normal gross motor, tone coordination and no tremor.  Psychiatric Exam: Alert and oriented -appropriate affect.    PHQ Depression Screen  PHQ-9 SCORE 12/21/2018   PHQ-9 Total Score 0       ASSESSMENT AND PLAN:      ICD-10-CM    1. Olecranon bursitis of left elbow M70.22 Lyme Disease Ab with reflex to WB Serum      Ehrlichia Anaplasma Sp by PCR     Ehrlichia Anaplasma Sp by PCR     Lyme Disease Ab with reflex to WB Serum   2. Left elbow pain M25.522 XR Elbow Left G/E 3 Views     CBC and Differential     CBC and Differential         Completed left elbow xray.  I personally reviewed the xray. I found no fracture appreciated upon initial read of xray.  Final read pending by radiology.    Completed CBC, lymes, anaplasma and ehrlichiosis. Pending.      Left elbow olecranon bursitis:  Encouraged to take ibuprofen for relief up to 4 times per day.  Encouraged rest and elevation. Wrap elbow in order to decrease swelling.  Encouraged to use ice or heat 15 minutes at a time several times per day to decrease pain.  Return to clinic with any change or worsening of symptoms.  Gave warning signs/symptoms.   IF swelling/redness is progressing then may need emergent referral to ortho to have the elbow readdressed.       Patient Instructions   Encouraged to take ibuprofen for relief up to 4 times per day.  Encouraged rest and elevation. Wrap elbow in order to decrease swelling.  Encouraged to use ice or heat 15 minutes at a time several times per day to decrease pain.  Return to clinic with any change or worsening of symptoms.      Patient Education     Bursitis of the Elbow (Olecranon)  Your elbow joint contains a small fluid-filled sac called a bursa. The bursa helps the muscles and tendons move smoothly over the bone. It also cushions and protects your elbow. Bursitis is when the bursa is inflamed or swollen. This is most often due to overuse of or injury to the elbow. Symptoms include swelling and pain. If the elbow is red and feels warm to the touch, the bursa itself may be infected.  In most cases, elbow bursitis resolves with medicine and self-care at home. It may take several weeks for the bursa to heal and the swelling to go away. In some cases, your healthcare provider may drain excess fluid from the bursa. Or, he or she may inject  medicine directly into the bursa to help relieve symptoms. In severe cases, you may need surgery to remove the bursa may. If there is concern that the bursa is infected, your healthcare provider may prescribe antibiotics to treat the infection.    Home care  Your healthcare provider may prescribe medicine to help relieve pain and swelling. This may be an over-the-counter pain reliever or prescription pain medicine. Take all medicines as directed. To help treat or prevent infection, your provider may prescribe antibiotics. If these are prescribed, take them as directed until they are gone.  The following are general care guidelines:    Apply an ice pack or bag of frozen peas wrapped in a thin towel to your elbow for 15 to 20 minutes at a time. Do this 3 to 4 times a day until pain and swelling improve.    Keep your elbow raised above the level of your heart whenever possible. This helps reduce swelling. When sitting or lying down, place your arm on a pillow that rests on your chest or on a pillow at your side.    Use an elastic wrap around the elbow joint to compress the area while it is healing. Make the wrap snug but not tight to the point of causing pain.    Rest your elbow to give it time to heal. You may need to wear an elbow pad to help protect and limit the movement of your elbow. During and after healing, avoid leaning on your elbows.  Follow-up care  Follow up with your healthcare provider, or as advised. If you have been referred to a specialist, make that appointment promptly.  When to seek medical advice  Call your healthcare provider right away if any of these occur:    Fever of 100.4 F (38 C) or higher, or as advised    Chills    Increased pain, swelling, warmth, redness, or drainage from the joint    Trouble moving the elbow joint    Numbness or tingling in the hand    Severe pain or swelling in forearm or hand    Loss of pink color and slow return of color after squeezing fingertip or hand  Date Last  Reviewed: 6/1/2016 2000-2018 [employer]. [address] [place] [address], [place] [address]. All rights reserved. This information is not intended as a substitute for professional medical care. Always follow your healthcare professional's instructions.              Shahida Betts PA-C..................3/11/2019 3:02 PM

## 2019-03-11 NOTE — PATIENT INSTRUCTIONS
Encouraged to take ibuprofen for relief up to 4 times per day.  Encouraged rest and elevation. Wrap elbow in order to decrease swelling.  Encouraged to use ice or heat 15 minutes at a time several times per day to decrease pain.  Return to clinic with any change or worsening of symptoms.      Patient Education     Bursitis of the Elbow (Olecranon)  Your elbow joint contains a small fluid-filled sac called a bursa. The bursa helps the muscles and tendons move smoothly over the bone. It also cushions and protects your elbow. Bursitis is when the bursa is inflamed or swollen. This is most often due to overuse of or injury to the elbow. Symptoms include swelling and pain. If the elbow is red and feels warm to the touch, the bursa itself may be infected.  In most cases, elbow bursitis resolves with medicine and self-care at home. It may take several weeks for the bursa to heal and the swelling to go away. In some cases, your healthcare provider may drain excess fluid from the bursa. Or, he or she may inject medicine directly into the bursa to help relieve symptoms. In severe cases, you may need surgery to remove the bursa may. If there is concern that the bursa is infected, your healthcare provider may prescribe antibiotics to treat the infection.    Home care  Your healthcare provider may prescribe medicine to help relieve pain and swelling. This may be an over-the-counter pain reliever or prescription pain medicine. Take all medicines as directed. To help treat or prevent infection, your provider may prescribe antibiotics. If these are prescribed, take them as directed until they are gone.  The following are general care guidelines:    Apply an ice pack or bag of frozen peas wrapped in a thin towel to your elbow for 15 to 20 minutes at a time. Do this 3 to 4 times a day until pain and swelling improve.    Keep your elbow raised above the level of your heart whenever possible. This helps reduce swelling. When sitting or  lying down, place your arm on a pillow that rests on your chest or on a pillow at your side.    Use an elastic wrap around the elbow joint to compress the area while it is healing. Make the wrap snug but not tight to the point of causing pain.    Rest your elbow to give it time to heal. You may need to wear an elbow pad to help protect and limit the movement of your elbow. During and after healing, avoid leaning on your elbows.  Follow-up care  Follow up with your healthcare provider, or as advised. If you have been referred to a specialist, make that appointment promptly.  When to seek medical advice  Call your healthcare provider right away if any of these occur:    Fever of 100.4 F (38 C) or higher, or as advised    Chills    Increased pain, swelling, warmth, redness, or drainage from the joint    Trouble moving the elbow joint    Numbness or tingling in the hand    Severe pain or swelling in forearm or hand    Loss of pink color and slow return of color after squeezing fingertip or hand  Date Last Reviewed: 6/1/2016 2000-2018 [employer]. [address] [place] [address]. All rights reserved. This information is not intended as a substitute for professional medical care. Always follow your healthcare professional's instructions.

## 2019-03-11 NOTE — LETTER
Rangel Florez  [address] [address]    3/18/2019      Dear Mr. Florez,      We've received the results back from the laboratory for the samples you gave in clinic.  Your labs are normal. Please contact us at [phone] with any questions or concerns that you have.    I attached your lab results for your records.        Take Care,         Shahida Betts PA-C    Resulted Orders   CBC and Differential   Result Value Ref Range    WBC 5.3 4.0 - 11.0 10e9/L    RBC Count 4.57 4.4 - 5.9 10e12/L    Hemoglobin 15.0 13.3 - 17.7 g/dL    Hematocrit 43.3 40.0 - 53.0 %    MCV 95 78 - 100 fl    MCH 32.8 26.5 - 33.0 pg    MCHC 34.6 31.5 - 36.5 g/dL    RDW 12.2 10.0 - 15.0 %    Platelet Count 288 150 - 450 10e9/L    Diff Method Automated Method     % Neutrophils 40.0 %    % Lymphocytes 43.4 %    % Monocytes 14.6 %    % Eosinophils 1.1 %    % Basophils 0.9 %    % Immature Granulocytes 0.0 %    Absolute Neutrophil 2.1 1.6 - 8.3 10e9/L    Absolute Lymphocytes 2.3 0.8 - 5.3 10e9/L    Absolute Monocytes 0.8 0.0 - 1.3 10e9/L    Absolute Eosinophils 0.1 0.0 - 0.7 10e9/L    Absolute Basophils 0.1 0.0 - 0.2 10e9/L    Abs Immature Granulocytes 0.0 0 - 0.4 10e9/L   Ehrlichia Anaplasma Sp by PCR   Result Value Ref Range    Anaplasma phagocytophilum Not Detected     Ehrlichia chaffeensis Not Detected     Ehrlichia ewingii/canis Not Detected     Ehrlichia muris-like Not Detected       Comment:      (Note)  INTERPRETIVE INFORMATION:  Ehrlichia and Anaplasma Species   by PCR    A negative result does not rule out the presence of PCR   inhibitors in the patient specimen or test-specific nucleic   acid in concentrations below the level of detection by this   assay.  Test developed and characteristics determined by [facility]. See Compliance Statement B: [email or web address]  Performed by [facility],  [address] [place] [id number] [phone]  [email or web address], Alfredo Mcdowell MD, Lab. Director     Lyme  Disease Ab with reflex to WB Serum   Result Value Ref Range    Lyme Disease Antibodies Serum 0.02 0.00 - 0.89      Comment:      Negative, Absence of detectable Borrelia burdorferi antibodies. A negative   result does not exclude the possibility of Borrelia burgdorferi infection. If   early Lyme disease is suspected, a second sample should be collected and   tested 2 to 4 weeks later.

## 2019-03-11 NOTE — NURSING NOTE
Chief Complaint   Patient presents with     Elbow Pain         Medication Reconciliation: complete    Luma Oconnor, LPN

## 2019-03-13 LAB — B BURGDOR IGG+IGM SER QL: 0.02 (ref 0–0.89)

## 2019-03-16 LAB
A PHAGOCYTOPH DNA BLD QL NAA+PROBE: NOT DETECTED
E CHAFFEENSIS DNA BLD QL NAA+PROBE: NOT DETECTED
E EWINGII DNA SPEC QL NAA+PROBE: NOT DETECTED
EHRLICHIA DNA SPEC QL NAA+PROBE: NOT DETECTED

## 2019-05-29 ENCOUNTER — OFFICE VISIT (OUTPATIENT)
Dept: FAMILY MEDICINE | Facility: OTHER | Age: 39
End: 2019-05-29
Attending: PHYSICIAN ASSISTANT
Payer: COMMERCIAL

## 2019-05-29 VITALS
DIASTOLIC BLOOD PRESSURE: 82 MMHG | OXYGEN SATURATION: 94 % | RESPIRATION RATE: 16 BRPM | HEART RATE: 86 BPM | SYSTOLIC BLOOD PRESSURE: 119 MMHG | HEIGHT: 73 IN | WEIGHT: 179.6 LBS | TEMPERATURE: 98.8 F | BODY MASS INDEX: 23.8 KG/M2
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
## 2019-05-29 PROCEDURE — 99214 OFFICE O/P EST MOD 30 MIN: CPT | Performed by: PHYSICIAN ASSISTANT

## 2019-05-29 RX ORDER — DOXYCYCLINE 100 MG/1
100 CAPSULE ORAL 2 TIMES DAILY
Qty: 14 CAPSULE | Refills: 0 | Status: SHIPPED | OUTPATIENT
Start: 2019-05-29 | End: 2019-06-05

## 2019-05-29 ASSESSMENT — PAIN SCALES - GENERAL: PAINLEVEL: NO PAIN (1)

## 2019-05-29 ASSESSMENT — MIFFLIN-ST. JEOR: SCORE: 1793.54

## 2019-05-29 NOTE — PROGRESS NOTES
"HPI:    Rangel Florez is a 38 year old male who presents to clinic today for evaluation of deer tick bite on his left chest  He found the tick tonight and removed most of it  He is otherwise feeling well, no neck pain, headache, body aches  He feels it could have been attached about 48 hours.     Past Medical History:   Diagnosis Date     Medical history reviewed with no changes        No current outpatient medications on file.       No Known Allergies    ROS:  General: feels well, no fever  Skin: POSITIVE for deer tick bite  HENT: negative  Respiratory: negative  Abdomen: negative  Musculoskeletal: negative  Neurological: negative      EXAM:  Vitals:    05/29/19 1822   BP: 119/82   BP Location: Left arm   Patient Position: Sitting   Cuff Size: Adult Large   Pulse: 86   Resp: 16   Temp: 98.8  F (37.1  C)   TempSrc: Tympanic   SpO2: 94%   Weight: 81.5 kg (179 lb 9.6 oz)   Height: 1.854 m (6' 1\")     General appearance: well appearing male, in no acute distress  Respiratory: clear to auscultation bilaterally  Cardiac: RRR with no murmurs  Dermatological: tick bite on left upper chest. Measures 2 cm mild erythema with 5 mm center. No warmth, no drainage. Non tender.   Psychological: normal affect, alert and pleasant      ASSESSMENT AND PLAN:    1. Tick bite, initial encounter      Deer tick bite  Doxycycline 100 mg oral tablet, take 2 tablets once today  Wash area of tick bite with warm soapy water, cover with topical antibiotic ointment 2-3 times/ daily for 7-10 days  Follow up with PCP if symptoms persist or worsen  Patient received verbal and written instruction including review of warning signs    Tammy Jones PA-C on 5/31/2019 at 6:02 PM      "

## 2019-05-29 NOTE — NURSING NOTE
"Patient presents to clinic for tick bite. Unsure how long tick has been attached but is partially remove. Patient thinks tick is still embedded.   Chief Complaint   Patient presents with     Insect Bites       Initial /82 (BP Location: Left arm, Patient Position: Sitting, Cuff Size: Adult Large)   Pulse 86   Temp 98.8  F (37.1  C) (Tympanic)   Resp 16   Ht 1.854 m (6' 1\")   Wt 81.5 kg (179 lb 9.6 oz)   SpO2 94%   BMI 23.70 kg/m   Estimated body mass index is 23.7 kg/m  as calculated from the following:    Height as of this encounter: 1.854 m (6' 1\").    Weight as of this encounter: 81.5 kg (179 lb 9.6 oz).  Medication Reconciliation: complete    Michaela Juarez LPN    "

## 2019-07-29 ENCOUNTER — HOSPITAL ENCOUNTER (EMERGENCY)
Facility: OTHER | Age: 39
Discharge: LEFT BEFORE DISCHARGE | End: 2019-07-29
Admitting: FAMILY MEDICINE
Payer: COMMERCIAL

## 2019-07-29 VITALS
HEART RATE: 77 BPM | BODY MASS INDEX: 23.7 KG/M2 | TEMPERATURE: 97.4 F | DIASTOLIC BLOOD PRESSURE: 92 MMHG | SYSTOLIC BLOOD PRESSURE: 143 MMHG | OXYGEN SATURATION: 100 % | HEIGHT: 73 IN | RESPIRATION RATE: 18 BRPM

## 2019-07-29 PROCEDURE — 99283 EMERGENCY DEPT VISIT LOW MDM: CPT | Performed by: FAMILY MEDICINE

## 2019-07-29 PROCEDURE — 93010 ELECTROCARDIOGRAM REPORT: CPT | Performed by: INTERNAL MEDICINE

## 2019-07-29 PROCEDURE — 93005 ELECTROCARDIOGRAM TRACING: CPT | Performed by: FAMILY MEDICINE

## 2019-07-29 NOTE — ED TRIAGE NOTES
"ED Nursing Triage Note (General)   ________________________________    Rangel Florez is a 39 year old Male that presents to triage private car  With history of  Right shoulder pain  reported by patient   Significant symptoms had onset 2 week(s) ago.  BP (!) 143/92   Pulse 77   Temp 97.4  F (36.3  C) (Temporal)   Resp 18   Ht 1.854 m (6' 1\")   SpO2 100%   BMI 23.70 kg/m  t  Patient appears alert  and oriented, in no acute distress., and cooperative, pleasant and calm behavior.    GCS Total = 15  Airway: intact  Breathing noted as Normal.  Circulation Normal  Skin normal, warm, dry  Action taken:  Placed in waiting room to await a room.  EKg performed and given to provider because orginial complaint was chest pain      PRE HOSPITAL PRIOR LIVING SITUATION Significant Other  "

## 2019-07-29 NOTE — ED NOTES
Pt taken to EMS and EKG completed, is in SR, pt states that he has right pain behind his right arm, pt has had the pain for 2 weeks and is worse in different positions.

## 2020-04-14 ENCOUNTER — E-VISIT (OUTPATIENT)
Dept: FAMILY MEDICINE | Facility: OTHER | Age: 40
End: 2020-04-14
Payer: COMMERCIAL

## 2020-04-14 DIAGNOSIS — J02.9 ALLERGIC PHARYNGITIS: Primary | ICD-10-CM

## 2020-04-14 PROCEDURE — 99421 OL DIG E/M SVC 5-10 MIN: CPT | Performed by: FAMILY MEDICINE

## 2020-04-14 NOTE — PATIENT INSTRUCTIONS
Patient Education     Understanding Nasal Allergies  Nasal allergies (also called allergic rhinitis) are a common health problem. They may be seasonal. This means they cause symptoms only at certain times of the year. Or they may be perennial. This means they cause symptoms all year long. Other health problems, such as asthma, often occur along with allergies as well.    What is an allergic reaction?  An allergy is a reaction to a substance called an allergen. Common allergens include:    Wind-borne pollen    Mold    Dust mites    Furry and feathered animals    Cockroaches  Normally, allergens are harmless. But when a person has allergies, the body thinks they are harmful. The body then attacks allergens with antibodies. Antibodies are attached to special cells called mast cells. Allergens stick to the antibodies. This makes the mast cells release histamine and other chemicals. This is an allergic reaction. The chemicals irritate nearby nasal tissue. This causes nasal allergy symptoms.  Common nasal allergy symptoms  Allergies can cause nasal tissue to swell. This makes the air passages smaller. The nose may feel stuffed up. The nose may also make extra mucus, which can plug the nasal passages or drip out of the nose. Mucus can drip down the back of the throat (postnasal drip) as well. Sinus tissue can swell. This may cause pain and headache. Common allergy symptoms include:    Runny nose with clear, watery discharge    Stuffy nose (nasal congestion)    Drainage down your throat (postnasal drip)    Sneezing    Red, watery eyes    Itchy nose, eyes, ears, and throat    Plugged-up ears (ear congestion)    Sore throat    Coughing    Sinus pain and swelling    Headache  It may not be allergies  Other health problems can cause symptoms like those of nasal allergies. These include:    Nonallergic rhinitis and viruses such as colds    Irritants and pollutants, such as strong odors or smoke    Certain medicines    Changes in  the weather   Treatment  Your healthcare provider will evaluate you to find the cause of your symptoms then recommend treatment. If your symptoms are due to nasal allergies, your healthcare provider may prescribe nasal steroid sprays or oral antihistamines to help reduce symptoms. Avoidance of the allergen will also be suggested. You may also be referred to an allergist.   Date Last Reviewed: 10/1/2016    6336-6163 [employer]. [address] [place] [address], [place] [address]. All rights reserved. This information is not intended as a substitute for professional medical care. Always follow your healthcare professional's instructions.

## 2020-04-25 ENCOUNTER — E-VISIT (OUTPATIENT)
Dept: FAMILY MEDICINE | Facility: OTHER | Age: 40
End: 2020-04-25
Payer: COMMERCIAL

## 2020-04-25 DIAGNOSIS — Z53.9 ERRONEOUS ENCOUNTER--DISREGARD: Primary | ICD-10-CM

## 2020-04-27 ENCOUNTER — TELEPHONE (OUTPATIENT)
Dept: FAMILY MEDICINE | Facility: OTHER | Age: 40
End: 2020-04-27

## 2020-04-27 NOTE — TELEPHONE ENCOUNTER
Routing to triage per AET note. Unsure why this is showing up as EVISIT.     Rukhsana Gasca LPN on 4/27/2020 at 12:05 PM

## 2020-04-27 NOTE — TELEPHONE ENCOUNTER
Patient notified, if he has symptoms that come up he will call.  Lizbeth Oconnor LPN ...... 4/27/2020 4:54 PM

## 2020-04-27 NOTE — TELEPHONE ENCOUNTER
Called patient to triage and patient states that he went through work comp and they are treating him with Doxycyline for 14 days due to deer tick bite.  Patient states he is not having any symptoms at this time.    States he is wondering about follow up blood work for this as he was advised through work comp to check on this.    Heidi Banda RN on 4/27/2020 at 12:18 PM

## 2020-04-27 NOTE — TELEPHONE ENCOUNTER
REBA NEWSOME Gautam  Male, 39 year old, 1980  MRN:   [id number]  SB#:   #1  Phone:   [phone] (M)  Weight:   81.5 kg (179 lb 9.6 oz)  HM Due?:   Due  Allergies  No Known Allergies  Pref Language:   English  PCP:   Arielle Chen,   Primary Cvg:   [employer] [place]  Other     Leti Masters MD routed conversation to Britany Whitaker LPN; [facility] Clinical Pool 2 hours ago (9:07 AM)       Leti Mastesr MD 2 hours ago (9:07 AM)          Please have this go through triage nurses.   Not a lot of info provided.   They should have a protocol for this.   Leti Masters MD         Documentation       Heidi Banda, RN routed conversation to Leti Masters MD 3 hours ago (8:11 AM)       Rangel Florez  Arielle Chen DO 2 days ago          Other  --------------------------------     Question: Please describe your symptoms.  Answer:   Attached dead deer tick on left thigh.     Question: Have you had these symptoms before?  Answer:   No     Question: How long have you been having these symptoms?  Answer:   Just today     Question: Please list any medications you are currently taking for this condition.  Answer:        Question: Please describe any probable cause for these symptoms.   Answer:        Question: Wrap up  Answer:        Question: Anything else you would like to add?  Answer:

## 2020-04-27 NOTE — TELEPHONE ENCOUNTER
Please have this go through triage nurses.   Not a lot of info provided.   They should have a protocol for this.   Leti Masters MD

## 2020-04-27 NOTE — TELEPHONE ENCOUNTER
I'm not sure what they are asking.   Typically I don't do follow up labs for lyme disease.   Unless I had abnormal labs to start with (elevated liver enzymes), but I'm not seeing any labs on him.   If his work comp provider wants specific labs, that person can order them to be done here.   Let me know if there is something I am missing.   Leti Masters MD

## 2020-12-22 ENCOUNTER — VIRTUAL VISIT (OUTPATIENT)
Dept: FAMILY MEDICINE | Facility: OTHER | Age: 40
End: 2020-12-22
Attending: FAMILY MEDICINE
Payer: COMMERCIAL

## 2020-12-22 DIAGNOSIS — R10.32 LLQ ABDOMINAL PAIN: Primary | ICD-10-CM

## 2020-12-22 PROCEDURE — 99213 OFFICE O/P EST LOW 20 MIN: CPT | Mod: 95 | Performed by: FAMILY MEDICINE

## 2020-12-22 ASSESSMENT — ANXIETY QUESTIONNAIRES
6. BECOMING EASILY ANNOYED OR IRRITABLE: NOT AT ALL
1. FEELING NERVOUS, ANXIOUS, OR ON EDGE: NOT AT ALL
2. NOT BEING ABLE TO STOP OR CONTROL WORRYING: NOT AT ALL
5. BEING SO RESTLESS THAT IT IS HARD TO SIT STILL: NOT AT ALL
IF YOU CHECKED OFF ANY PROBLEMS ON THIS QUESTIONNAIRE, HOW DIFFICULT HAVE THESE PROBLEMS MADE IT FOR YOU TO DO YOUR WORK, TAKE CARE OF THINGS AT HOME, OR GET ALONG WITH OTHER PEOPLE: NOT DIFFICULT AT ALL
3. WORRYING TOO MUCH ABOUT DIFFERENT THINGS: NOT AT ALL
7. FEELING AFRAID AS IF SOMETHING AWFUL MIGHT HAPPEN: NOT AT ALL
GAD7 TOTAL SCORE: 0

## 2020-12-22 ASSESSMENT — PATIENT HEALTH QUESTIONNAIRE - PHQ9: 5. POOR APPETITE OR OVEREATING: NOT AT ALL

## 2020-12-22 NOTE — PROGRESS NOTES
"Rangel Florez is a 40 year old male who is being evaluated via a billable video visit.      The patient has been notified of following:     \"This video visit will be conducted via a call between you and your physician/provider. We have found that certain health care needs can be provided without the need for an in-person physical exam.  This service lets us provide the care you need with a video conversation.  If a prescription is necessary we can send it directly to your pharmacy.  If lab work is needed we can place an order for that and you can then stop by our lab to have the test done at a later time.    Video visits are billed at different rates depending on your insurance coverage.  Please reach out to your insurance provider with any questions.    If during the course of the call the physician/provider feels a video visit is not appropriate, you will not be charged for this service.\"    Patient has given verbal consent for Video visit? Yes  How would you like to obtain your AVS? MyChart  If you are dropped from the video visit, the video invite should be resent to:   Will anyone else be joining your video visit? No    Subjective     Rangel Florez is a 40 year old male who presents today via video visit for the following health issues:    Patient complains of feeling a weird sensation in the right lower quadrant.  States it feels \"fuzzy.  Is also used a description of pain in the right lower quadrant.  It is constant.  Not associate with any nausea vomiting or changes in bowel habits.  Not seeming to worse get worse.  Her getting better.  Started Sunday.  No changes in bowel or bladder habits.       PAIN IN THE LOWER RIGHT ABDOMEN, 2-3 DAYS, NO DIARRHEA, VOMITING        Video Start Time: 10:19 AM        Review of Systems         Objective           Vitals:  No vitals were obtained today due to virtual visit.    Physical Exam  Constitutional:       Appearance: Normal appearance.      Comments: On the " video patient does not appear to be in any distress   HENT:      Head: Normocephalic and atraumatic.   Neurological:      Mental Status: He is alert.   Psychiatric:         Mood and Affect: Mood normal.         Thought Content: Thought content normal.          GENERAL: Healthy, alert and no distress  EYES: Eyes grossly normal to inspection.  No discharge or erythema, or obvious scleral/conjunctival abnormalities.  RESP: No audible wheeze, cough, or visible cyanosis.  No visible retractions or increased work of breathing.    SKIN: Visible skin clear. No significant rash, abnormal pigmentation or lesions.  NEURO: Cranial nerves grossly intact.  Mentation and speech appropriate for age.  PSYCH: Mentation appears normal, affect normal/bright, judgement and insight intact, normal speech and appearance well-groomed.              Assessment & Plan     LLQ abdominal pain per patient report fuzzy  I discussed with the patient signs and symptoms of appendicitis.  Seems to be stable.  And describes it not as much pain is just a fuzzy feeling.  Advised him I cannot get a good answer without seeing him.  I would expect it is an appendicitis and should be getting worse.  Advised signs and symptoms of guarding.  Peritoneal signs are.  Which sounds like he currently does not have.  If worsening follow-up in the clinic or if he would like to get an evaluation.                    No follow-ups on file.    Bello Schmitt MD  [facility]      Video-Visit Details    Type of service:  Video Visit    Video End Time:10:27 AM    Originating Location (pt. Location): Home    Distant Location (provider location):  [facility]     Platform used for Video Visit: [employer]

## 2020-12-23 ASSESSMENT — ANXIETY QUESTIONNAIRES: GAD7 TOTAL SCORE: 0

## 2020-12-24 ENCOUNTER — OFFICE VISIT (OUTPATIENT)
Dept: FAMILY MEDICINE | Facility: OTHER | Age: 40
End: 2020-12-24
Attending: NURSE PRACTITIONER
Payer: COMMERCIAL

## 2020-12-24 ENCOUNTER — HOSPITAL ENCOUNTER (OUTPATIENT)
Dept: ULTRASOUND IMAGING | Facility: OTHER | Age: 40
End: 2020-12-24
Attending: NURSE PRACTITIONER
Payer: COMMERCIAL

## 2020-12-24 VITALS
SYSTOLIC BLOOD PRESSURE: 124 MMHG | TEMPERATURE: 98.3 F | RESPIRATION RATE: 18 BRPM | HEIGHT: 73 IN | HEART RATE: 82 BPM | BODY MASS INDEX: 24.12 KG/M2 | OXYGEN SATURATION: 95 % | DIASTOLIC BLOOD PRESSURE: 76 MMHG | WEIGHT: 182 LBS

## 2020-12-24 DIAGNOSIS — R10.31 ABDOMINAL PAIN, RIGHT LOWER QUADRANT: Primary | ICD-10-CM

## 2020-12-24 LAB
ANION GAP SERPL CALCULATED.3IONS-SCNC: 6 MMOL/L (ref 3–14)
BASOPHILS # BLD AUTO: 0 10E9/L (ref 0–0.2)
BASOPHILS NFR BLD AUTO: 0.4 %
BUN SERPL-MCNC: 13 MG/DL (ref 7–25)
CALCIUM SERPL-MCNC: 9.4 MG/DL (ref 8.6–10.3)
CHLORIDE SERPL-SCNC: 104 MMOL/L (ref 98–107)
CO2 SERPL-SCNC: 28 MMOL/L (ref 21–31)
CREAT SERPL-MCNC: 0.78 MG/DL (ref 0.7–1.3)
DIFFERENTIAL METHOD BLD: ABNORMAL
EOSINOPHIL # BLD AUTO: 0.1 10E9/L (ref 0–0.7)
EOSINOPHIL NFR BLD AUTO: 1 %
ERYTHROCYTE [DISTWIDTH] IN BLOOD BY AUTOMATED COUNT: 11.9 % (ref 10–15)
GFR SERPL CREATININE-BSD FRML MDRD: >90 ML/MIN/{1.73_M2}
GLUCOSE SERPL-MCNC: 115 MG/DL (ref 70–105)
HCT VFR BLD AUTO: 46.2 % (ref 40–53)
HGB BLD-MCNC: 16.3 G/DL (ref 13.3–17.7)
IMM GRANULOCYTES # BLD: 0 10E9/L (ref 0–0.4)
IMM GRANULOCYTES NFR BLD: 0.1 %
LYMPHOCYTES # BLD AUTO: 2.6 10E9/L (ref 0.8–5.3)
LYMPHOCYTES NFR BLD AUTO: 37 %
MCH RBC QN AUTO: 33.1 PG (ref 26.5–33)
MCHC RBC AUTO-ENTMCNC: 35.3 G/DL (ref 31.5–36.5)
MCV RBC AUTO: 94 FL (ref 78–100)
MONOCYTES # BLD AUTO: 0.7 10E9/L (ref 0–1.3)
MONOCYTES NFR BLD AUTO: 9.8 %
NEUTROPHILS # BLD AUTO: 3.6 10E9/L (ref 1.6–8.3)
NEUTROPHILS NFR BLD AUTO: 51.7 %
PLATELET # BLD AUTO: 307 10E9/L (ref 150–450)
POTASSIUM SERPL-SCNC: 4.4 MMOL/L (ref 3.5–5.1)
RBC # BLD AUTO: 4.93 10E12/L (ref 4.4–5.9)
SODIUM SERPL-SCNC: 138 MMOL/L (ref 134–144)
WBC # BLD AUTO: 7 10E9/L (ref 4–11)

## 2020-12-24 PROCEDURE — 99214 OFFICE O/P EST MOD 30 MIN: CPT | Performed by: NURSE PRACTITIONER

## 2020-12-24 PROCEDURE — 85025 COMPLETE CBC W/AUTO DIFF WBC: CPT | Mod: ZL | Performed by: NURSE PRACTITIONER

## 2020-12-24 PROCEDURE — 36415 COLL VENOUS BLD VENIPUNCTURE: CPT | Mod: ZL | Performed by: NURSE PRACTITIONER

## 2020-12-24 PROCEDURE — 80048 BASIC METABOLIC PNL TOTAL CA: CPT | Mod: ZL | Performed by: NURSE PRACTITIONER

## 2020-12-24 PROCEDURE — 76705 ECHO EXAM OF ABDOMEN: CPT

## 2020-12-24 ASSESSMENT — MIFFLIN-ST. JEOR: SCORE: 1794.43

## 2020-12-24 ASSESSMENT — PAIN SCALES - GENERAL: PAINLEVEL: MODERATE PAIN (5)

## 2020-12-24 NOTE — PROGRESS NOTES
"HPI:    Rangel Florez is a 40 year old male  who presents to [facility] today for abdominal pain.    RLQ abdominal pain for the past 5 days, persistent pain, no worsening or lessening.  Pain is mild, describes as \"fuzzy feeling.\"  Nothing seems to make it better or worse.  Pain very rarely radiates to his periumbilical area otherwise does not radiate.  No nausea or vomiting.  Appetite is the same, states he eats a lot.  Normal bowel movements, daily, no diarrhea or constipation.  No blood in stools, no red, tarry or black stools.  No fevers or chills.  No increased gas, no burping.  No acid reflux.  No back pain.  No dysuria, frequency, urgency, or hematuria.  No scrotal or penile pain or swelling.  No penile discharge.  No STD concerns.         Past Medical History:   Diagnosis Date     Medical history reviewed with no changes      Past Surgical History:   Procedure Laterality Date     HC TOOTH EXTRACTION W/FORCEP       Social History     Tobacco Use     Smoking status: Current Every Day Smoker     Packs/day: 1.00     Types: Cigarettes     Smokeless tobacco: Never Used   Substance Use Topics     Alcohol use: Yes     Comment: 6 beer a week      No current outpatient medications on file.     No Known Allergies      Past medical history, past surgical history, current medications and allergies reviewed and accurate to the best of my knowledge.        ROS:  Refer to HPI    /76   Pulse 82   Temp 98.3  F (36.8  C) (Tympanic)   Resp 18   Ht 1.854 m (6' 1\")   Wt 82.6 kg (182 lb)   SpO2 95%   BMI 24.01 kg/m      EXAM:  General Appearance: Well appearing adult male, non ill appearance, appropriate appearance for age. No acute distress  Ears:  Hearing intact to normal voice tone.  Eyes: conjunctivae normal without erythema or irritation, corneas clear, no drainage or crusting, no eyelid swelling, pupils equal   Orophayrnx: voice clear.    Respiratory: normal chest wall and respirations.  Normal effort.  " Clear to auscultation bilaterally, no wheezing, crackles or rhonchi.  No increased work of breathing.  No cough appreciated.  Cardiac: RRR with no murmurs  Abdomen: soft, RLQ tenderness with palpation, nontender epigastric area, bilateral upper quadrants, LLQ and periumbilical area, no rigidity, no rebound tenderness or guarding, normal active bowel sounds present.  No pain or guarding with jarring movements such as jumping up and down or getting on/off exam table.  :  No suprapubic tenderness to palpation.  No CVA tenderness to palpation.  No inguinal tenderness to palpation.  Musculoskeletal:  Equal movement of bilateral upper extremities.  Equal movement of bilateral lower extremities.  Normal gait.    Dermatological: no rashes noted of exposed skin  Psychological: normal affect, alert, oriented, and pleasant.       Ultrasound and Labs:  Results for orders placed or performed in visit on 12/24/20   US Appendix Only     Status: None    Narrative    PROCEDURE: US APPENDIX ONLY 12/24/2020 11:50 AM    HISTORY: Abdominal pain, right lower quadrant    COMPARISONS: None.    TECHNIQUE: Routine ultrasound of the right lower quadrant.    FINDINGS: The appendix is not visualized. Appendicitis can therefore  not be excluded.    No abnormal bowel is seen. No focal fluid collection is seen.         Impression    IMPRESSION: Nonvisualization of the appendix.    MAKAYLA SANDOVAL MD   CBC and Differential     Status: Abnormal   Result Value Ref Range    WBC 7.0 4.0 - 11.0 10e9/L    RBC Count 4.93 4.4 - 5.9 10e12/L    Hemoglobin 16.3 13.3 - 17.7 g/dL    Hematocrit 46.2 40.0 - 53.0 %    MCV 94 78 - 100 fl    MCH 33.1 (H) 26.5 - 33.0 pg    MCHC 35.3 31.5 - 36.5 g/dL    RDW 11.9 10.0 - 15.0 %    Platelet Count 307 150 - 450 10e9/L    Diff Method Automated Method     % Neutrophils 51.7 %    % Lymphocytes 37.0 %    % Monocytes 9.8 %    % Eosinophils 1.0 %    % Basophils 0.4 %    % Immature Granulocytes 0.1 %    Absolute Neutrophil  3.6 1.6 - 8.3 10e9/L    Absolute Lymphocytes 2.6 0.8 - 5.3 10e9/L    Absolute Monocytes 0.7 0.0 - 1.3 10e9/L    Absolute Eosinophils 0.1 0.0 - 0.7 10e9/L    Absolute Basophils 0.0 0.0 - 0.2 10e9/L    Abs Immature Granulocytes 0.0 0 - 0.4 10e9/L   Basic Metabolic Panel     Status: Abnormal   Result Value Ref Range    Sodium 138 134 - 144 mmol/L    Potassium 4.4 3.5 - 5.1 mmol/L    Chloride 104 98 - 107 mmol/L    Carbon Dioxide 28 21 - 31 mmol/L    Anion Gap 6 3 - 14 mmol/L    Glucose 115 (H) 70 - 105 mg/dL    Urea Nitrogen 13 7 - 25 mg/dL    Creatinine 0.78 0.70 - 1.30 mg/dL    GFR Estimate >90 >60 mL/min/[1.73_m2]    GFR Estimate If Black >90 >60 mL/min/[1.73_m2]    Calcium 9.4 8.6 - 10.3 mg/dL                 ASSESSMENT/PLAN:    I have reviewed the nursing notes.  I have reviewed the findings, diagnosis, plan and need for follow up with the patient.    1. Abdominal pain, right lower quadrant    - CBC and Differential; Future  - CBC and Differential  - Basic Metabolic Panel; Future  - Basic Metabolic Panel  - US Appendix Only    Normal CBC  Normal BMP  US of right lower quadrant - non visualized appendix        Discussed with patient that due with his normal CBC and no US findings of appendicitis along with no other symptoms such as no nausea or vomiting, no change in appetite, no change in bowels, no urinary symptoms, no peritoneal pain, and discomfort now for 5 days without worsening and pain is not severe,  I do not feel it is necessary to get an abdominal CT scan today.      Symptomatic treatment - Encouraged fluids, etc   May use over-the-counter Tylenol or ibuprofen PRN    Discussed warning signs/symptoms indicative of need to f/u including but not limited to: fever, vomiting, change in bowels, worsening pain, or any concerns.  Patient is in agreement with plan.    Follow up if symptoms persist or worsen or concerns      I explained my diagnostic considerations and recommendations to the patient, who voiced  understanding and agreement with the treatment plan. All questions were answered. We discussed potential side effects of any prescribed or recommended therapies, as well as expectations for response to treatments.    Disclaimer:  This note consists of words and symbols derived from keyboarding, dictation, or using voice recognition software. As a result, there may be errors in the script that have gone undetected. Please consider this when interpreting information found in this note.

## 2020-12-24 NOTE — NURSING NOTE
"Chief Complaint   Patient presents with     Abdominal Pain     Pain in lower right quadrant has been going on since Sunday. He states that the pain is more of an annoyance. Pain has not changed since Sunday. He did have a virtual visit with Dr. arndt on 12/22.      Initial There were no vitals taken for this visit. Estimated body mass index is 23.7 kg/m  as calculated from the following:    Height as of 7/29/19: 1.854 m (6' 1\").    Weight as of 5/29/19: 81.5 kg (179 lb 9.6 oz).         Medication Reconciliation: Complete      Saulo Giles LPN   "

## 2020-12-27 ENCOUNTER — HEALTH MAINTENANCE LETTER (OUTPATIENT)
Age: 40
End: 2020-12-27

## 2021-10-09 ENCOUNTER — HEALTH MAINTENANCE LETTER (OUTPATIENT)
Age: 41
End: 2021-10-09

## 2021-10-11 ENCOUNTER — OFFICE VISIT (OUTPATIENT)
Dept: FAMILY MEDICINE | Facility: OTHER | Age: 41
End: 2021-10-11
Attending: PHYSICIAN ASSISTANT
Payer: COMMERCIAL

## 2021-10-11 VITALS
WEIGHT: 183.2 LBS | RESPIRATION RATE: 18 BRPM | BODY MASS INDEX: 24.17 KG/M2 | OXYGEN SATURATION: 98 % | HEART RATE: 84 BPM | DIASTOLIC BLOOD PRESSURE: 84 MMHG | TEMPERATURE: 98.6 F | SYSTOLIC BLOOD PRESSURE: 122 MMHG
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
## 2021-10-11 PROCEDURE — 99213 OFFICE O/P EST LOW 20 MIN: CPT | Performed by: PHYSICIAN ASSISTANT

## 2021-10-11 RX ORDER — DOXYCYCLINE 100 MG/1
100 CAPSULE ORAL 2 TIMES DAILY
Qty: 28 CAPSULE | Refills: 0 | Status: SHIPPED | OUTPATIENT
Start: 2021-10-11 | End: 2021-10-25

## 2021-10-11 ASSESSMENT — PAIN SCALES - GENERAL: PAINLEVEL: NO PAIN (0)

## 2021-10-11 NOTE — PROGRESS NOTES
ASSESSMENT/PLAN:    I have reviewed the nursing notes.  I have reviewed the findings, diagnosis, plan and need for follow up with the patient.    1. Tick bite, unspecified site, initial encounter  - doxycycline hyclate (VIBRAMYCIN) 100 MG capsule; Take 1 capsule (100 mg) by mouth 2 times daily for 14 days  Dispense: 28 capsule; Refill: 0  - Vital signs stable. PE notable for tick bite, entirety of HPI/PE available for review below.  Prescription of Doxycyline x 14 days duration. Use caution in sunlight as can lead to increased risk of sunburn while on ABX.  Avoid taking medication with daily multivitamin. Lab testing: too early for testing as already < 1 week from bite. Discussed to monitor for fevers, chills/flu like symptoms, stiffness of neck, swollen lymph nodes (lymphadenopathy), neurologic or cardiac changes, worsening joint/muscle aches, etc., if these symptoms occur, patient is agreeable to return for reevaluation. Patient is in agreement and understanding of the above treatment plan. All questions and concerns were addressed and answered to patient's satisfaction. AVS reviewed with patient.    Discussed warning signs/symptoms indicative of need to f/u    Follow up if symptoms persist or worsen or concerns    I explained my diagnostic considerations and recommendations to the patient, who voiced understanding and agreement with the treatment plan. All questions were answered. We discussed potential side effects of any prescribed or recommended therapies, as well as expectations for response to treatments.    Sabina Juan PA-C  10/11/2021  5:30 PM    HPI:    Rangel Florez is a 41 year old male  who presents to [facility] today for concerns of tick bite to left triceps and midline abdomen. Symptoms: erythema around sites, no bullseye/ Type of Tick: deer. Length of attachment: unsure. No fevers, chills, rash, lymphadenopathy, muscle aches, neck stiffness, facial nerve palsy. No symptoms of anaphylaxis  or significant allergic reaction    Treatments Tried: tick removal   Prior Tick Bites: yes    Last Tetanus Shot: 2018  No history of lyme disease    Allergies: none     PCP: DO April    Past Medical History:   Diagnosis Date     Medical history reviewed with no changes      Past Surgical History:   Procedure Laterality Date     HC TOOTH EXTRACTION W/FORCEP       Social History     Tobacco Use     Smoking status: Current Every Day Smoker     Packs/day: 1.00     Types: Cigarettes     Smokeless tobacco: Never Used   Substance Use Topics     Alcohol use: Yes     Comment: 6 beer a week      Current Outpatient Medications   Medication Sig Dispense Refill     doxycycline hyclate (VIBRAMYCIN) 100 MG capsule Take 1 capsule (100 mg) by mouth 2 times daily for 14 days 28 capsule 0     No Known Allergies  Past medical history, past surgical history, current medications and allergies reviewed and accurate to the best of my knowledge.      ROS:  Refer to HPI    /84   Pulse 84   Temp 98.6  F (37  C) (Tympanic)   Resp 18   Wt 83.1 kg (183 lb 3.2 oz)   SpO2 98%   BMI 24.17 kg/m       EXAM:  General Appearance: Well appearing 41-year old male, appropriate appearance for age. No acute distress  Respiratory: normal chest wall and respirations.  Normal effort.  Clear to auscultation bilaterally, no wheezing, crackles or rhonchi.  No increased work of breathing.  No cough appreciated.  Cardiac: RRR with no murmurs  Musculoskeletal:  Equal movement of bilateral upper extremities.  Equal movement of bilateral lower extremities.  Normal gait.    Dermatological: 1 cm area of erythema to posterior left upper triceps, there is no evidence of tick remains, no fluctuance or calor. There is a pin point area of erythema/bite site to midline lower abdomen, no calor, fluctuance or tick remains  Psychological: normal affect, alert, oriented, and pleasant.     Labs:  None     Xray:  None

## 2021-10-11 NOTE — NURSING NOTE
"Chief Complaint   Patient presents with     Insect Bites     Was bit by a 2 deer tick this weekend. One on his left tricep area is red and swollen.     Initial There were no vitals taken for this visit. Estimated body mass index is 24.01 kg/m  as calculated from the following:    Height as of 12/24/20: 1.854 m (6' 1\").    Weight as of 12/24/20: 82.6 kg (182 lb).     FOOD SECURITY SCREENING QUESTIONS  Hunger Vital Signs:  Within the past 12 months we worried whether our food would run out before we got money to buy more. Never  Within the past 12 months the food we bought just didn't last and we didn't have money to get more. Never      Medication Reconciliation: Complete      Saulo Giles LPN   "

## 2021-10-11 NOTE — PATIENT INSTRUCTIONS
Please refer to your AVS for follow up and pain/symptoms management recommendations (I.e.: medications, helpful conservative treatment modalities, appropriate follow up if need to a specialist or family practice, etc.). Please return to either your primary care provider rapid[facility]urgent care clinic if your symptoms change or worsen.     Discharge instructions:  -If you were prescribed a medication(s), please take this as prescribed/directed  -Monitor your symptoms, if changing/worsening, return to UC/ER or PCP for follow up    Tick Bite   -For some, labs are also drawn to check for Lyme disease - if you had labs drawn, please note that these labs can take 3-7 days to return, either your PCP or a member of the UC team should reach out to you with your results, if you do not receive these, please reach out to your PCP.   -Please take the course of antibiotics until completion (if prescribed).    -For prevention of further bites, we recommend long shirts/pants while outdoors, tick repellant with > 20% DEET, take a shower or bathe within 2 hours of being outdoors, check yourself for ticks (look in unusual locations such as hair, behind ears/knees, etc.).   -For pain control (if needed), if you are able to take Ibuprofen and Tylenol, we recommend alternating these (see note below). Do not wear a patch over your eye (unless directed to do so).    -Alternate every 4 hours as needed. I.e.: Ibuprofen at 8am, Tylenol 12pm, Ibuprofen 4pm    -Daily maximum of Tylenol is 4000mg (recommend staying under 3000mg)   -Daily maximum of Ibuprofen is 1200mg (take no more than six 200mg pills a day)    Recommend to hold daily multivitamin  Doxycycline as this can make the medication less effective.  We also recommend that you watch your sunlight exposure as you will burn quicker than usual.    Additional Information:  Any remaining embedded matter can be left and will clear naturally.   Monitor the area where ticks were removed as they can  become infected also. May use antibacterial ointment.    -Monitor for the below symptoms for 30 days and call your healthcare provider if you get any of the following:   ? Fatigue    ? Headache    ? Neck stiffness    ? Myalgias/muscle soreness   ? Arthralgias/joint pain   ? Regional lymphadenopathy/swollen lymph nodes   ? Fever    * Keep vigilant with tick checks.    Risk of Lyme disease is very low with the tick has been attached for fewer than 36 hours.    Common locations to check for ticks in the future include entering around her ears, and and around the hair, inside the bellybutton, under your arms, around her waist, between your legs and the back sides of your knees.    Approach to prophylaxis -- per up to date guidelines, they agree with the Infectious Diseases Society of [place] (IDSA) guidelines that recommend antibiotic prophylaxis only in patients who meet all of the following criteria:  ?Attached tick identified as an adult or nymphal I. scapularis tick (deer tick).  ?Tick is estimated to have been attached for ?36 hours (by degree of engorgement or time of exposure).  ?Prophylaxis is begun within 72 hours of tick removal.  ?Local rate of infection of ticks with B. burgdorferi is ?20 percent (these rates of infection have been shown to occur in parts of [place], parts of the [place], and parts of [place] and [place]).    You were prescribed an antibiotic, please take into consideration the following information:  - Take entire course of antibiotic even if you start to feel better.  - Antibiotics can cause stomach upset including nausea and diarrhea. Read your bottle or ask the pharmacist if antibiotic can be taken with food to help prevent nausea. If you have symptoms of diarrhea you can take an over-the-counter probiotic and/or increase foods with probiotics such as yogurt, [place], sauerkraut.  -Use caution in sunlight as can lead to increased risk of sunburn while on ABX  (antibiotics).

## 2021-11-03 ENCOUNTER — IMMUNIZATION (OUTPATIENT)
Dept: FAMILY MEDICINE | Facility: OTHER | Age: 41
End: 2021-11-03
Attending: FAMILY MEDICINE
Payer: COMMERCIAL

## 2021-11-03 PROCEDURE — 91300 PR COVID VAC PFIZER DIL RECON 30 MCG/0.3 ML IM: CPT

## 2021-11-03 PROCEDURE — 0004A PR COVID VAC PFIZER DIL RECON 30 MCG/0.3 ML IM: CPT

## 2022-01-05 ENCOUNTER — OFFICE VISIT (OUTPATIENT)
Dept: FAMILY MEDICINE | Facility: OTHER | Age: 42
End: 2022-01-05
Attending: PHYSICIAN ASSISTANT
Payer: COMMERCIAL

## 2022-01-05 VITALS
DIASTOLIC BLOOD PRESSURE: 82 MMHG | SYSTOLIC BLOOD PRESSURE: 130 MMHG | TEMPERATURE: 97.7 F | HEART RATE: 75 BPM | RESPIRATION RATE: 16 BRPM | OXYGEN SATURATION: 98 % | WEIGHT: 179.3 LBS | BODY MASS INDEX: 23.76 KG/M2 | HEIGHT: 73 IN

## 2022-01-05 DIAGNOSIS — Z13.1 SCREENING FOR DIABETES MELLITUS: ICD-10-CM

## 2022-01-05 DIAGNOSIS — Z00.00 ROUTINE HISTORY AND PHYSICAL EXAMINATION OF ADULT: Primary | ICD-10-CM

## 2022-01-05 DIAGNOSIS — R10.31 RLQ ABDOMINAL PAIN: ICD-10-CM

## 2022-01-05 DIAGNOSIS — Z23 NEEDS FLU SHOT: ICD-10-CM

## 2022-01-05 DIAGNOSIS — Z13.220 SCREENING CHOLESTEROL LEVEL: ICD-10-CM

## 2022-01-05 LAB
ALBUMIN SERPL-MCNC: 4.9 G/DL (ref 3.5–5.7)
ALBUMIN UR-MCNC: NEGATIVE MG/DL
ALP SERPL-CCNC: 56 U/L (ref 34–104)
ALT SERPL W P-5'-P-CCNC: 33 U/L (ref 7–52)
ANION GAP SERPL CALCULATED.3IONS-SCNC: 5 MMOL/L (ref 3–14)
APPEARANCE UR: CLEAR
AST SERPL W P-5'-P-CCNC: 20 U/L (ref 13–39)
BASOPHILS # BLD AUTO: 0 10E3/UL (ref 0–0.2)
BASOPHILS NFR BLD AUTO: 0 %
BILIRUB SERPL-MCNC: 0.7 MG/DL (ref 0.3–1)
BILIRUB UR QL STRIP: NEGATIVE
BUN SERPL-MCNC: 12 MG/DL (ref 7–25)
CALCIUM SERPL-MCNC: 9.9 MG/DL (ref 8.6–10.3)
CHLORIDE BLD-SCNC: 102 MMOL/L (ref 98–107)
CHOLEST SERPL-MCNC: 242 MG/DL
CO2 SERPL-SCNC: 30 MMOL/L (ref 21–31)
COLOR UR AUTO: NORMAL
CREAT SERPL-MCNC: 0.88 MG/DL (ref 0.7–1.3)
EOSINOPHIL # BLD AUTO: 0.1 10E3/UL (ref 0–0.7)
EOSINOPHIL NFR BLD AUTO: 1 %
ERYTHROCYTE [DISTWIDTH] IN BLOOD BY AUTOMATED COUNT: 12 % (ref 10–15)
FASTING STATUS PATIENT QL REPORTED: ABNORMAL
GFR SERPL CREATININE-BSD FRML MDRD: >90 ML/MIN/1.73M2
GLUCOSE BLD-MCNC: 98 MG/DL (ref 70–105)
GLUCOSE UR STRIP-MCNC: NEGATIVE MG/DL
HCT VFR BLD AUTO: 46.1 % (ref 40–53)
HDLC SERPL-MCNC: 48 MG/DL (ref 23–92)
HGB BLD-MCNC: 16.3 G/DL (ref 13.3–17.7)
HGB UR QL STRIP: NEGATIVE
IMM GRANULOCYTES # BLD: 0 10E3/UL
IMM GRANULOCYTES NFR BLD: 0 %
KETONES UR STRIP-MCNC: NEGATIVE MG/DL
LDLC SERPL CALC-MCNC: 176 MG/DL
LEUKOCYTE ESTERASE UR QL STRIP: NEGATIVE
LYMPHOCYTES # BLD AUTO: 2.8 10E3/UL (ref 0.8–5.3)
LYMPHOCYTES NFR BLD AUTO: 40 %
MCH RBC QN AUTO: 33.5 PG (ref 26.5–33)
MCHC RBC AUTO-ENTMCNC: 35.4 G/DL (ref 31.5–36.5)
MCV RBC AUTO: 95 FL (ref 78–100)
MONOCYTES # BLD AUTO: 0.8 10E3/UL (ref 0–1.3)
MONOCYTES NFR BLD AUTO: 11 %
NEUTROPHILS # BLD AUTO: 3.4 10E3/UL (ref 1.6–8.3)
NEUTROPHILS NFR BLD AUTO: 48 %
NITRATE UR QL: NEGATIVE
NONHDLC SERPL-MCNC: 194 MG/DL
NRBC # BLD AUTO: 0 10E3/UL
NRBC BLD AUTO-RTO: 0 /100
PH UR STRIP: 7 [PH] (ref 5–9)
PLATELET # BLD AUTO: 299 10E3/UL (ref 150–450)
POTASSIUM BLD-SCNC: 4.5 MMOL/L (ref 3.5–5.1)
PROT SERPL-MCNC: 7.2 G/DL (ref 6.4–8.9)
RBC # BLD AUTO: 4.87 10E6/UL (ref 4.4–5.9)
SODIUM SERPL-SCNC: 137 MMOL/L (ref 134–144)
SP GR UR STRIP: 1.01 (ref 1–1.03)
TRIGL SERPL-MCNC: 91 MG/DL
UROBILINOGEN UR STRIP-MCNC: NORMAL MG/DL
WBC # BLD AUTO: 7.1 10E3/UL (ref 4–11)

## 2022-01-05 PROCEDURE — 90471 IMMUNIZATION ADMIN: CPT | Performed by: PHYSICIAN ASSISTANT

## 2022-01-05 PROCEDURE — 90686 IIV4 VACC NO PRSV 0.5 ML IM: CPT | Performed by: PHYSICIAN ASSISTANT

## 2022-01-05 PROCEDURE — 82040 ASSAY OF SERUM ALBUMIN: CPT | Mod: ZL | Performed by: PHYSICIAN ASSISTANT

## 2022-01-05 PROCEDURE — 81003 URINALYSIS AUTO W/O SCOPE: CPT | Mod: ZL | Performed by: PHYSICIAN ASSISTANT

## 2022-01-05 PROCEDURE — 85014 HEMATOCRIT: CPT | Mod: ZL | Performed by: PHYSICIAN ASSISTANT

## 2022-01-05 PROCEDURE — 99396 PREV VISIT EST AGE 40-64: CPT | Mod: 25 | Performed by: PHYSICIAN ASSISTANT

## 2022-01-05 PROCEDURE — 80053 COMPREHEN METABOLIC PANEL: CPT | Mod: ZL | Performed by: PHYSICIAN ASSISTANT

## 2022-01-05 PROCEDURE — 36415 COLL VENOUS BLD VENIPUNCTURE: CPT | Mod: ZL | Performed by: PHYSICIAN ASSISTANT

## 2022-01-05 PROCEDURE — 80061 LIPID PANEL: CPT | Mod: ZL | Performed by: PHYSICIAN ASSISTANT

## 2022-01-05 ASSESSMENT — ANXIETY QUESTIONNAIRES
3. WORRYING TOO MUCH ABOUT DIFFERENT THINGS: NOT AT ALL
2. NOT BEING ABLE TO STOP OR CONTROL WORRYING: NOT AT ALL
5. BEING SO RESTLESS THAT IT IS HARD TO SIT STILL: NOT AT ALL
6. BECOMING EASILY ANNOYED OR IRRITABLE: NOT AT ALL
GAD7 TOTAL SCORE: 0
7. FEELING AFRAID AS IF SOMETHING AWFUL MIGHT HAPPEN: NOT AT ALL
1. FEELING NERVOUS, ANXIOUS, OR ON EDGE: NOT AT ALL
GAD7 TOTAL SCORE: 0
GAD7 TOTAL SCORE: 0
7. FEELING AFRAID AS IF SOMETHING AWFUL MIGHT HAPPEN: NOT AT ALL
4. TROUBLE RELAXING: NOT AT ALL

## 2022-01-05 ASSESSMENT — MIFFLIN-ST. JEOR: SCORE: 1769.24

## 2022-01-05 ASSESSMENT — PATIENT HEALTH QUESTIONNAIRE - PHQ9
SUM OF ALL RESPONSES TO PHQ QUESTIONS 1-9: 0
10. IF YOU CHECKED OFF ANY PROBLEMS, HOW DIFFICULT HAVE THESE PROBLEMS MADE IT FOR YOU TO DO YOUR WORK, TAKE CARE OF THINGS AT HOME, OR GET ALONG WITH OTHER PEOPLE: NOT DIFFICULT AT ALL
SUM OF ALL RESPONSES TO PHQ QUESTIONS 1-9: 0

## 2022-01-05 ASSESSMENT — PAIN SCALES - GENERAL: PAINLEVEL: NO PAIN (0)

## 2022-01-05 NOTE — PROGRESS NOTES
"Nursing Notes:   Rhys Bowers LPN  1/5/2022  9:06 AM  Signed  Chief Complaint   Patient presents with     Physical   The patient reports a \"sensation in his lower right abdomen.\" He states he had an ultrasound about a year ago and nothing was found. No other complaints noted.     Initial /82   Pulse 75   Temp 97.7  F (36.5  C) (Tympanic)   Resp 16   Ht 1.842 m (6' 0.5\")   Wt 81.3 kg (179 lb 4.8 oz)   SpO2 98%   BMI 23.98 kg/m   Estimated body mass index is 23.98 kg/m  as calculated from the following:    Height as of this encounter: 1.842 m (6' 0.5\").    Weight as of this encounter: 81.3 kg (179 lb 4.8 oz).  Medication Reconciliation: complete    FOOD SECURITY SCREENING QUESTIONS  Hunger Vital Signs:  Within the past 12 months we worried whether our food would run out before we got money to buy more. Never  Within the past 12 months the food we bought just didn't last and we didn't have money to get more. Never    Advanced Care Directive Reviewed    Rhys Bowers LPN        HPI: Rangel Florez who presents for a yearly exam.  Concerns include: The patient reports a \"sensation in his lower right abdomen.\" He states he had an ultrasound about a year ago and nothing was found. No other complaints noted.  He has noticed some discomfort on the right lower quadrant over the last year.  Had a normal ultrasound of the appendix 1 year ago however they were unable to visualize the appendix.  He states the area is not painful.  It feels \"weird\".  Had some tightness this past evening along with discomfort.  States it does not feel right.  Symptoms come and go.  Particularly worse at night.  History of back pain in the past.  Apparently 2 years ago and has back pain is currently stable.  Has noticed that he is urinating more left-sided 2 years.  Worse at night.  No history of kidney infections, stones, dysuria, urgency, blood in urine or stool, diarrhea, constipation, nausea, vomiting.  Occasionally has " more frequent BMs.    STD concerns: no  Cholesterol/DM concerns/screening: completed today  Prostate cancer screening discussed:  Not indicated, patient is average risk and younger than 50.  Family history of colon or prostate CA?: no  Colonoscopy: none  Tobacco use: yes  Immunizations: need flu shot    Answers for HPI/ROS submitted by the patient on 1/5/2022  If you checked off any problems, how difficult have these problems made it for you to do your work, take care of things at home, or get along with other people?: Not difficult at all  PHQ9 TOTAL SCORE: 0  JEOVANY 7 TOTAL SCORE: 0  How many servings of fruits and vegetables do you eat daily?: 2-3  On average, how many sweetened beverages do you drink each day (Examples: soda, juice, sweet tea, etc.  Do NOT count diet or artificially sweetened beverages)?: 1  How many minutes a day do you exercise enough to make your heart beat faster?: 60 or more  How many days a week do you exercise enough to make your heart beat faster?: 4  How many days per week do you miss taking your medication?: 0    Patient Active Problem List    Diagnosis Date Noted     Tobacco abuse 12/21/2018     Priority: Medium     Routine history and physical examination of adult 12/21/2018     Priority: Medium     Chronic left-sided low back pain without sciatica 04/06/2017     Priority: Medium       Past Medical History:   Diagnosis Date     Medical history reviewed with no changes        Past Surgical History:   Procedure Laterality Date     HC TOOTH EXTRACTION W/FORCEP         Family History   Problem Relation Age of Onset     No Known Problems Mother      Lung Cancer Father      No Known Problems Brother      Cerebrovascular Disease Maternal Grandmother         stroke       Social History     Tobacco Use     Smoking status: Current Every Day Smoker     Packs/day: 1.00     Types: Cigarettes     Smokeless tobacco: Never Used   Substance Use Topics     Alcohol use: Yes     Comment: 6 beer a week   "      No current outpatient medications on file.       No Known Allergies    REVIEW OF SYSTEMS:  Refer to HPI.    Physical Exam:  /82   Pulse 75   Temp 97.7  F (36.5  C) (Tympanic)   Resp 16   Ht 1.842 m (6' 0.5\")   Wt 81.3 kg (179 lb 4.8 oz)   SpO2 98%   BMI 23.98 kg/m     CONSTITUTIONAL:  Alert, cooperative, NAD.  HEAD:  Normal. Normocephalic, atraumatic.  EYES:  Normal external eye, conjunctiva, lids.  No scleral icterus.  ENT/MOUTH:  External ears and nose normal.  TMs normal.  Moist mucous membranes. Oropharynx clear.   ENDO: No thyromegaly or thyroid nodules.  LYMPH:  Nocervical or supraclavicular LA.    CARDIOVASCULAR: Regular, S1, S2.  No S3 or S4.  No murmur/gallop/rub.  No peripheral edema.  RESPIRATORY: CTA bilaterally, no wheezes, rhonchi or rales.  GI: Bowel sounds wnl. Soft, nontender, nondistended.  No masses or HSM.  No rebound or guarding.  MSKEL: Grossly normal ROM.  No clubbing.  INTEGUMENTARY:  Warm, dry.  No rash noted on exposed skin.  NEUROLOGIC:  Facies symmetric.  Grossly normal movement and tone.  No tremor.  PSYCHIATRIC:  Affect normal.  Speech fluent.       PHQ Depression Screen  PHQ-9 SCORE 12/21/2018 1/5/2022   PHQ-9 Total Score MyChart - 0   PHQ-9 Total Score 0 0       ASSESSMENT/PLAN:    ICD-10-CM    1. Routine history and physical examination of adult  Z00.00    2. RLQ abdominal pain  R10.31 CBC and Differential     Comprehensive Metabolic Panel     Lipid Panel     UA reflex to Microscopic     Adult General Surg Referral     CBC and Differential     Comprehensive Metabolic Panel     Lipid Panel   3. Screening cholesterol level  Z13.220 CBC and Differential     Comprehensive Metabolic Panel     Lipid Panel     CBC and Differential     Comprehensive Metabolic Panel     Lipid Panel   4. Screening for diabetes mellitus  Z13.1 CBC and Differential     Comprehensive Metabolic Panel     CBC and Differential     Comprehensive Metabolic Panel   5. Needs flu shot  Z23 FLU SHOT 6 " "MOS - 50 YRS (FLUZONE)         Right lower quadrant abdominal pain: Completed CBC, CMP, and urinalysis to rule out concerns.  Labs are pending.  Completed lipid profile for cholesterol screening.  Completed glucose for diabetes mellitus screening.  Patient was given a flu shot.  Gave warning signs and symptoms.  Encourage close monitoring.  Discussed for completing an abdominal CT for safety.  General surgery.  Patient would like to closely monitor symptoms and recheck as needed.  Declines referrals at this time.    Repeat physical in 1 year.    Colon and prostate cancer screening discussed.      Counseled on healthy diet andexercise.    Patient Instructions   Healthy Strategies  1. Eat at least 3 meals a day and never skip breakfast.  2. Eat more slowly.  3. Decrease portion size.  4. Provide structure by using meal replacement bars or shakes, and/or low calorie frozen meals.  5. For good nutrition incorporate fruit, vegetables, whole grains, lean protein, and low-fat dairy.  6. Remove trigger foods fromyour environment to avoid impulse eating.  7. Increase physical activity: get a pedometer and aim for 10,000 steps a day or 30-35 minutes of activity 5 days per week.  8. Weigh yourself daily or at least weekly.  9. Keep a record of what you eat and your activity.  10. Establish a support system suchas a friend, group or program.    11. Read Shade Pierce's \"Eat to Live\". Remember it is important to have a minimum of 1200calories a day, okay to use olive oil, 40 grams of fiber daily. No more than two servings (the size of your palm) of red meat a week.     Please consider the following general health recommendations:    Eat a quality diet (generally, low in simple sugars, starches, cholesterol and saturated fat.)    Please get 1200 mg of calcium in divided doses with 800 units vitamin D in your diet daily. Take supplements as needed to obtain full recommended amounts.    Stay physically active. Regular walking or " other exercise is one of the best ways to minimize pain of arthritis; maintain independence and mobility; maintain bone strength; maintain conditioning of your heart. Find something you enjoy and a friend to do it with you.    Maintain ideal weight. Your Body mass index is Body mass index is 23.98 kg/m .. Generally a BMI of 20-25 is considered ideal. Overweight is defined as 25-30, obese is 30-35 and markedly obese is greater than 35.    Apply sun block (SPF 25 or greater) on exposed skin anytime you are out in the sun to prevent skin cancer.     Wear a seatbelt whenever you are in a car.    Obtain a flu shot every fall.    You should have a tetanus booster at least once every 10 years.    Check blood sugar annually. Cholesterol annually unless you have had a normal level when last checked within 5 years.     I recommend thatyou have a general physical exam every year.     Abdominal pain:   Call or return to clinic as needed if your symptoms worsen or fail to improve as anticipated.     If the pain does not begin improving, localizes to the right lower belly, there is increased fever, or other progression of symptoms, return for reassessment.    Should I see a doctor or nurse about my stomach ache? -- Most people do not need to see a doctor or nurse for a stomach ache. But you should see your doctor or nurse if:  ?You have bloody bowel movements, diarrhea, or vomiting  ?Your pain is severe and lasts more than an hour or comes and goes for more than 24 hours  ?You cannot eat or drink for hours  ?You have a fever higher than 102 F (39 C)  ?You lose a lot of weight without trying to, or lose interest in food          Shahida Betts PA-C ..................1/5/2022 9:23 AM

## 2022-01-05 NOTE — NURSING NOTE
"Chief Complaint   Patient presents with     Physical   The patient reports a \"sensation in his lower right abdomen.\" He states he had an ultrasound about a year ago and nothing was found. No other complaints noted.     Initial /82   Pulse 75   Temp 97.7  F (36.5  C) (Tympanic)   Resp 16   Ht 1.842 m (6' 0.5\")   Wt 81.3 kg (179 lb 4.8 oz)   SpO2 98%   BMI 23.98 kg/m   Estimated body mass index is 23.98 kg/m  as calculated from the following:    Height as of this encounter: 1.842 m (6' 0.5\").    Weight as of this encounter: 81.3 kg (179 lb 4.8 oz).  Medication Reconciliation: complete    FOOD SECURITY SCREENING QUESTIONS  Hunger Vital Signs:  Within the past 12 months we worried whether our food would run out before we got money to buy more. Never  Within the past 12 months the food we bought just didn't last and we didn't have money to get more. Never    Advanced Care Directive Reviewed    Rhys Bowers LPN    "

## 2022-01-05 NOTE — PATIENT INSTRUCTIONS
"Healthy Strategies  1. Eat at least 3 meals a day and never skip breakfast.  2. Eat more slowly.  3. Decrease portion size.  4. Provide structure by using meal replacement bars or shakes, and/or low calorie frozen meals.  5. For good nutrition incorporate fruit, vegetables, whole grains, lean protein, and low-fat dairy.  6. Remove trigger foods fromyour environment to avoid impulse eating.  7. Increase physical activity: get a pedometer and aim for 10,000 steps a day or 30-35 minutes of activity 5 days per week.  8. Weigh yourself daily or at least weekly.  9. Keep a record of what you eat and your activity.  10. Establish a support system suchas a friend, group or program.    11. Read Shade Pierce's \"Eat to Live\". Remember it is important to have a minimum of 1200calories a day, okay to use olive oil, 40 grams of fiber daily. No more than two servings (the size of your palm) of red meat a week.     Please consider the following general health recommendations:    Eat a quality diet (generally, low in simple sugars, starches, cholesterol and saturated fat.)    Please get 1200 mg of calcium in divided doses with 800 units vitamin D in your diet daily. Take supplements as needed to obtain full recommended amounts.    Stay physically active. Regular walking or other exercise is one of the best ways to minimize pain of arthritis; maintain independence and mobility; maintain bone strength; maintain conditioning of your heart. Find something you enjoy and a friend to do it with you.    Maintain ideal weight. Your Body mass index is Body mass index is 23.98 kg/m .. Generally a BMI of 20-25 is considered ideal. Overweight is defined as 25-30, obese is 30-35 and markedly obese is greater than 35.    Apply sun block (SPF 25 or greater) on exposed skin anytime you are out in the sun to prevent skin cancer.     Wear a seatbelt whenever you are in a car.    Obtain a flu shot every fall.    You should have a tetanus booster at " least once every 10 years.    Check blood sugar annually. Cholesterol annually unless you have had a normal level when last checked within 5 years.     I recommend thatyou have a general physical exam every year.     Abdominal pain:   Call or return to clinic as needed if your symptoms worsen or fail to improve as anticipated.     If the pain does not begin improving, localizes to the right lower belly, there is increased fever, or other progression of symptoms, return for reassessment.    Should I see a doctor or nurse about my stomach ache? -- Most people do not need to see a doctor or nurse for a stomach ache. But you should see your doctor or nurse if:  ?You have bloody bowel movements, diarrhea, or vomiting  ?Your pain is severe and lasts more than an hour or comes and goes for more than 24 hours  ?You cannot eat or drink for hours  ?You have a fever higher than 102 F (39 C)  ?You lose a lot of weight without trying to, or lose interest in food

## 2022-01-06 ASSESSMENT — PATIENT HEALTH QUESTIONNAIRE - PHQ9: SUM OF ALL RESPONSES TO PHQ QUESTIONS 1-9: 0

## 2022-01-06 ASSESSMENT — ANXIETY QUESTIONNAIRES: GAD7 TOTAL SCORE: 0

## 2022-01-12 ENCOUNTER — HOSPITAL ENCOUNTER (OUTPATIENT)
Facility: OTHER | Age: 42
End: 2022-01-12
Attending: SURGERY | Admitting: SURGERY
Payer: COMMERCIAL

## 2022-01-12 ENCOUNTER — OFFICE VISIT (OUTPATIENT)
Dept: SURGERY | Facility: OTHER | Age: 42
End: 2022-01-12
Attending: SURGERY
Payer: COMMERCIAL

## 2022-01-12 VITALS
OXYGEN SATURATION: 98 % | RESPIRATION RATE: 16 BRPM | DIASTOLIC BLOOD PRESSURE: 88 MMHG | SYSTOLIC BLOOD PRESSURE: 138 MMHG | HEIGHT: 72 IN | BODY MASS INDEX: 24.65 KG/M2 | HEART RATE: 74 BPM | TEMPERATURE: 98.6 F | WEIGHT: 182 LBS

## 2022-01-12 DIAGNOSIS — R10.84 GENERALIZED ABDOMINAL PAIN: ICD-10-CM

## 2022-01-12 DIAGNOSIS — R19.7 DIARRHEA, UNSPECIFIED TYPE: Primary | ICD-10-CM

## 2022-01-12 DIAGNOSIS — K92.1 MELENA: ICD-10-CM

## 2022-01-12 PROBLEM — Z00.00 ROUTINE HISTORY AND PHYSICAL EXAMINATION OF ADULT: Status: RESOLVED | Noted: 2018-12-21 | Resolved: 2022-01-12

## 2022-01-12 PROCEDURE — 99244 OFF/OP CNSLTJ NEW/EST MOD 40: CPT | Performed by: SURGERY

## 2022-01-12 RX ORDER — ONDANSETRON 2 MG/ML
4 INJECTION INTRAMUSCULAR; INTRAVENOUS
Status: CANCELLED | OUTPATIENT
Start: 2022-01-12

## 2022-01-12 RX ORDER — SODIUM CHLORIDE, SODIUM LACTATE, POTASSIUM CHLORIDE, CALCIUM CHLORIDE 600; 310; 30; 20 MG/100ML; MG/100ML; MG/100ML; MG/100ML
INJECTION, SOLUTION INTRAVENOUS CONTINUOUS
Status: CANCELLED | OUTPATIENT
Start: 2022-01-12

## 2022-01-12 RX ORDER — BISACODYL 5 MG/1
TABLET, DELAYED RELEASE ORAL
Qty: 2 TABLET | Refills: 0 | Status: SHIPPED | OUTPATIENT
Start: 2022-01-12

## 2022-01-12 RX ORDER — LIDOCAINE 40 MG/G
CREAM TOPICAL
Status: CANCELLED | OUTPATIENT
Start: 2022-01-12

## 2022-01-12 RX ORDER — POLYETHYLENE GLYCOL 3350, SODIUM CHLORIDE, SODIUM BICARBONATE, POTASSIUM CHLORIDE 420; 11.2; 5.72; 1.48 G/4L; G/4L; G/4L; G/4L
POWDER, FOR SOLUTION ORAL
Qty: 4000 ML | Refills: 0 | Status: SHIPPED | OUTPATIENT
Start: 2022-01-12

## 2022-01-12 ASSESSMENT — PAIN SCALES - GENERAL: PAINLEVEL: NO PAIN (0)

## 2022-01-12 ASSESSMENT — MIFFLIN-ST. JEOR: SCORE: 1773.55

## 2022-01-12 NOTE — NURSING NOTE
Chief Complaint   Patient presents with     Consult     RLQ abd pain         Initial /88   Pulse 74   Temp 98.6  F (37  C) (Tympanic)   Resp 16   Ht 1.829 m (6')   Wt 82.6 kg (182 lb)   SpO2 98%   BMI 24.68 kg/m   Estimated body mass index is 24.68 kg/m  as calculated from the following:    Height as of this encounter: 1.829 m (6').    Weight as of this encounter: 82.6 kg (182 lb).       Medication Reconciliation: Complete      Norma J. Gosselin, LPN

## 2022-01-12 NOTE — PROGRESS NOTES
Surgical Clinic Consult  Referring physician:  MEIR Betts  Primary physician:     Arielle Chen    Chief complaint:   Abdominal discomfort    History of present illness:  This is a 41 year old male I am seeing in consultation for abdominal discomfort, diarrhea, gas bloating, constipation, black tarry stools.  This is all occurred over the past year.  Seems to be related to eating food of any kind.  No family history of inflammatory bowel disease.  Moves his bowels multiple times per day after eating usually loose.  No weight loss.  CBC and Comp panel are unremarkable.    Past medical history:   Past Medical History:   Diagnosis Date     Medical history reviewed with no changes        Pastsurgical history:  Past Surgical History:   Procedure Laterality Date     HC TOOTH EXTRACTION W/FORCEP         Current medications:  Current Outpatient Medications   Medication Sig Dispense Refill     bisacodyl (DULCOLAX) 5 MG EC tablet Take two tablets at noon day prior to colonoscopy 2 tablet 0     polyethylene glycol-electrolytes (NULYTELY WITH FLAVOR PACKS) 420 g solution Take 250 mL every 10 minutes the day prior to colonoscopy 4000 mL 0       Allergies:  No Known Allergies    Family history:  Family History   Problem Relation Age of Onset     No Known Problems Mother      Lung Cancer Father      No Known Problems Brother      Cerebrovascular Disease Maternal Grandmother         stroke       Social history:  Social History     Socioeconomic History     Marital status: Single     Spouse name: Not on file     Number of children: Not on file     Years of education: Not on file     Highest education level: Not on file   Occupational History     Not on file   Tobacco Use     Smoking status: Current Every Day Smoker     Packs/day: 1.00     Years: 20.00     Pack years: 20.00     Types: Cigarettes     Smokeless tobacco: Never Used   Vaping Use     Vaping Use: Never used   Substance and Sexual Activity     Alcohol use: Yes     Comment: 6  beer a week      Drug use: No     Sexual activity: Yes     Partners: Female   Other Topics Concern     Parent/sibling w/ CABG, MI or angioplasty before 65F 55M? Not Asked   Social History Narrative    Sig other  [place] DNR    paul no children     Social Determinants of Health     Financial Resource Strain: Not on file   Food Insecurity: Not on file   Transportation Needs: Not on file   Physical Activity: Not on file   Stress: Not on file   Social Connections: Not on file   Intimate Partner Violence: Not on file   Housing Stability: Not on file       PROBLEM LIST:  Patient Active Problem List   Diagnosis     Chronic left-sided low back pain without sciatica     Tobacco abuse     Diarrhea     Generalized abdominal pain     Melena     Review of systems:  COMPLETE REVIEW OF SYSTEMS: Denies problems  Gastrointestinal: See HPI  Cardiovascular: Denies problems  Respiratory: Denies problems  Genitourinary: Denies problems  Musculoskeletal: Denies problems  Skin: Denies problems  Neurologic: Denies problems  Psychiatric: Denies problems  Endocrine: Denies problems  Heme/Lymphatic: Denies problems  Vascular: Denies problems      Physical exam: /88   Pulse 74   Temp 98.6  F (37  C) (Tympanic)   Resp 16   Ht 1.829 m (6')   Wt 82.6 kg (182 lb)   SpO2 98%   BMI 24.68 kg/m        General: this is a pleasant male patient in no acute distress.  Patient is awake alert and oriented x3 .   Respiratory: Clear  Cardiovascular: Regular rate and rhythm  Abdominal: Flat soft and nontender.  No scars.  No bulges.    Assessment:   Generalized abdominal pain, melena and diarrhea.  Needs complete GI work-up for inflammatory bowel disease.     Plan:    EGD/colonoscopy.  Risks of bleeding, perforation, aspiration, potential incomplete examination discussed and wishes to proceed.      Tulio Perez MD FACS

## 2022-02-08 RX ORDER — SODIUM CHLORIDE, SODIUM LACTATE, POTASSIUM CHLORIDE, CALCIUM CHLORIDE 600; 310; 30; 20 MG/100ML; MG/100ML; MG/100ML; MG/100ML
INJECTION, SOLUTION INTRAVENOUS CONTINUOUS
Status: CANCELLED | OUTPATIENT
Start: 2022-02-08

## 2022-02-08 RX ORDER — NALOXONE HYDROCHLORIDE 0.4 MG/ML
0.2 INJECTION, SOLUTION INTRAMUSCULAR; INTRAVENOUS; SUBCUTANEOUS
Status: CANCELLED | OUTPATIENT
Start: 2022-02-08

## 2022-02-08 RX ORDER — NALOXONE HYDROCHLORIDE 0.4 MG/ML
0.4 INJECTION, SOLUTION INTRAMUSCULAR; INTRAVENOUS; SUBCUTANEOUS
Status: CANCELLED | OUTPATIENT
Start: 2022-02-08

## 2022-02-08 RX ORDER — FLUMAZENIL 0.1 MG/ML
0.2 INJECTION, SOLUTION INTRAVENOUS
Status: CANCELLED | OUTPATIENT
Start: 2022-02-08 | End: 2022-02-08

## 2022-06-03 ENCOUNTER — HOSPITAL ENCOUNTER (OUTPATIENT)
Dept: GENERAL RADIOLOGY | Facility: OTHER | Age: 42
Discharge: HOME | End: 2022-06-03
Attending: FAMILY MEDICINE
Payer: COMMERCIAL

## 2022-06-03 ENCOUNTER — OFFICE VISIT (OUTPATIENT)
Dept: FAMILY MEDICINE | Facility: OTHER | Age: 42
End: 2022-06-03
Attending: FAMILY MEDICINE
Payer: COMMERCIAL

## 2022-06-03 ENCOUNTER — TELEPHONE (OUTPATIENT)
Dept: FAMILY MEDICINE | Facility: OTHER | Age: 42
End: 2022-06-03

## 2022-06-03 VITALS
WEIGHT: 177.6 LBS | TEMPERATURE: 97.7 F | OXYGEN SATURATION: 100 % | BODY MASS INDEX: 24.09 KG/M2 | DIASTOLIC BLOOD PRESSURE: 80 MMHG | RESPIRATION RATE: 20 BRPM | SYSTOLIC BLOOD PRESSURE: 138 MMHG | HEART RATE: 59 BPM

## 2022-06-03 DIAGNOSIS — M25.512 LEFT ANTERIOR SHOULDER PAIN: Primary | ICD-10-CM

## 2022-06-03 DIAGNOSIS — M25.512 LEFT ANTERIOR SHOULDER PAIN: ICD-10-CM

## 2022-06-03 LAB — ERYTHROCYTE [SEDIMENTATION RATE] IN BLOOD BY WESTERGREN METHOD: 2 MM/HR (ref 0–15)

## 2022-06-03 PROCEDURE — 73030 X-RAY EXAM OF SHOULDER: CPT | Mod: LT

## 2022-06-03 PROCEDURE — 36415 COLL VENOUS BLD VENIPUNCTURE: CPT | Mod: ZL | Performed by: FAMILY MEDICINE

## 2022-06-03 PROCEDURE — 99213 OFFICE O/P EST LOW 20 MIN: CPT | Performed by: FAMILY MEDICINE

## 2022-06-03 PROCEDURE — 85652 RBC SED RATE AUTOMATED: CPT | Mod: ZL | Performed by: FAMILY MEDICINE

## 2022-06-03 PROCEDURE — 86618 LYME DISEASE ANTIBODY: CPT | Mod: ZL | Performed by: FAMILY MEDICINE

## 2022-06-03 RX ORDER — DOXYCYCLINE 100 MG/1
100 CAPSULE ORAL 2 TIMES DAILY
Qty: 28 CAPSULE | Refills: 0 | Status: SHIPPED | OUTPATIENT
Start: 2022-06-03 | End: 2022-06-17

## 2022-06-03 ASSESSMENT — PAIN SCALES - GENERAL: PAINLEVEL: MODERATE PAIN (5)

## 2022-06-03 NOTE — NURSING NOTE
Patient is here for left shoulder pain, started 3 weeks ago.     Medication Reconciliation: complete    FOOD SECURITY SCREENING QUESTIONS  Hunger Vital Signs:  Within the past 12 months we worried whether our food would run out before we got money to buy more. Never  Within the past 12 months the food we bought just didn't last and we didn't have money to get more. Never  Leana Hutton LPN 6/3/2022 11:09 AM       Advance care directive on file? no  Advance care directive provided to patient? no       Leana Hutton LPN

## 2022-06-03 NOTE — PROGRESS NOTES
Assessment & Plan   Healthy 41-year-old male with sudden onset of left shoulder pain of unclear etiology.  He is a [place] and has multiple exposures to ticks throughout the season.  He is concerned this may indicate a tickborne illness.  Opted to treat him empirically until labs are available.  X-ray of the shoulder was obtained that was negative for acute changes.  Left anterior shoulder pain    - XR Shoulder Left G/E 3 Views; Future  - Lyme Disease Total Abs Bld with Reflex to Confirm CLIA; Future  - Sedimentation Rate (ESR); Future  - doxycycline hyclate (VIBRAMYCIN) 100 MG capsule; Take 1 capsule (100 mg) by mouth 2 times daily for 14 days  - Lyme Disease Total Abs Bld with Reflex to Confirm CLIA  - Sedimentation Rate (ESR)    Patient Instructions   Motrin 200 mg take 2 tabs 2 x daily for inflammation    Check labs today    Start doxy           Tobacco Cessation:   reports that he has been smoking cigarettes. He has a 20.00 pack-year smoking history. He has never used smokeless tobacco.          No follow-ups on file.    Jayne Miller MD  [facility]    Subjective     Rangel Florez is a 41 year old male who presents today for the following   health issues:    Nursing Notes:   Leana Hutton LPN  6/3/2022 11:10 AM  Sign at exiting of workspace  Patient is here for left shoulder pain, started 3 weeks ago.     Medication Reconciliation: complete    FOOD SECURITY SCREENING QUESTIONS  Hunger Vital Signs:  Within the past 12 months we worried whether our food would run out before we got money to buy more. Never  Within the past 12 months the food we bought just didn't last and we didn't have money to get more. Never  Leana Hutton LPN 6/3/2022 11:09 AM       Advance care directive on file? no  Advance care directive provided to patient? no       Leana Hutton LPN      42 yo male presents with left shoulder, came on suddenly.  No trauma or injury.  He is a [place] and is  concerned it might indicate a tickborne illness.  He denies any known rash.  He has been exposed to multiple deer ticks this season.    No other joint pain or swelling.  Denies fever or chills.\        Review of Systems  Constitutional, HEENT, cardiovascular, pulmonary, gi and gu systems are negative, except as otherwise noted.    Objective   /80   Pulse 59   Temp 97.7  F (36.5  C) (Tympanic)   Resp 20   Wt 80.6 kg (177 lb 9.6 oz)   SpO2 100%   BMI 24.09 kg/m    Body mass index is 24.09 kg/m .    Physical Exam  Musculoskeletal:      Left shoulder: Normal. No swelling, deformity, effusion, laceration, tenderness or bony tenderness. Normal range of motion. Normal strength. Normal pulse.      Cervical back: Normal range of motion.      Comments: Negative impingement signs.    No other joint swelling.   Skin:     Findings: No rash.   Neurological:      Mental Status: He is alert.           Results for orders placed or performed during the hospital encounter of 06/03/22   XR Shoulder Left G/E 3 Views     Status: None    Narrative    PROCEDURE:  XR SHOULDER LEFT G/E 3 VIEWS    HISTORY: Left anterior shoulder pain    COMPARISON:  None.    TECHNIQUE:  Mukesh views of the shoulder were obtained.    FINDINGS:  No fracture or dislocation is identified. The joint spaces  are preserved.        Impression    IMPRESSION: Normal left shoulder      DEEPA FLOOD MD         SYSTEM ID:  [id number]   Results for orders placed or performed in visit on 06/03/22   Lyme Disease Total Abs Bld with Reflex to Confirm CLIA     Status: Normal   Result Value Ref Range    Lyme Disease Antibodies Total 0.08 <0.90   Sedimentation Rate (ESR)     Status: Normal   Result Value Ref Range    Erythrocyte Sedimentation Rate 2 0 - 15 mm/hr                Answers for HPI/ROS submitted by the patient on 6/3/2022  How many servings of fruits and vegetables do you eat daily?: 2-3  On average, how many sweetened beverages do you drink each day  (Examples: soda, juice, sweet tea, etc.  Do NOT count diet or artificially sweetened beverages)?: 0  How many minutes a day do you exercise enough to make your heart beat faster?: 30 to 60  How many days a week do you exercise enough to make your heart beat faster?: 5  How many days per week do you miss taking your medication?: 0  What is the reason for your visit today?: Shoulder pin  When did your symptoms begin?: 3-4 weeks ago  What are your symptoms?: Shoulder pain  How would you describe these symptoms?: Moderate  Are your symptoms:: Worsening  Have you had these symptoms before?: No  Is there anything that makes you feel worse?: Sleeping

## 2022-06-04 LAB — B BURGDOR IGG+IGM SER QL: 0.08

## 2022-09-17 ENCOUNTER — HEALTH MAINTENANCE LETTER (OUTPATIENT)
Age: 42
End: 2022-09-17

## 2022-11-08 ENCOUNTER — OFFICE VISIT (OUTPATIENT)
Dept: FAMILY MEDICINE | Facility: OTHER | Age: 42
End: 2022-11-08
Attending: PHYSICIAN ASSISTANT
Payer: COMMERCIAL

## 2022-11-08 ENCOUNTER — HOSPITAL ENCOUNTER (OUTPATIENT)
Dept: GENERAL RADIOLOGY | Facility: OTHER | Age: 42
Discharge: HOME | End: 2022-11-08
Attending: NURSE PRACTITIONER
Payer: COMMERCIAL

## 2022-11-08 VITALS
SYSTOLIC BLOOD PRESSURE: 124 MMHG | HEART RATE: 63 BPM | BODY MASS INDEX: 23.83 KG/M2 | DIASTOLIC BLOOD PRESSURE: 82 MMHG | WEIGHT: 175.7 LBS | OXYGEN SATURATION: 98 % | TEMPERATURE: 99.6 F

## 2022-11-08 DIAGNOSIS — M25.512 ACUTE PAIN OF LEFT SHOULDER: Primary | ICD-10-CM

## 2022-11-08 DIAGNOSIS — M79.10 MUSCLE PAIN: ICD-10-CM

## 2022-11-08 PROCEDURE — 73030 X-RAY EXAM OF SHOULDER: CPT | Mod: LT

## 2022-11-08 PROCEDURE — 99213 OFFICE O/P EST LOW 20 MIN: CPT | Performed by: NURSE PRACTITIONER

## 2022-11-08 RX ORDER — CYCLOBENZAPRINE HCL 10 MG
10 TABLET ORAL 3 TIMES DAILY PRN
Qty: 15 TABLET | Refills: 0 | Status: SHIPPED | OUTPATIENT
Start: 2022-11-08 | End: 2022-11-13

## 2022-11-08 ASSESSMENT — PAIN SCALES - GENERAL: PAINLEVEL: SEVERE PAIN (7)

## 2022-11-08 NOTE — NURSING NOTE
Patient presents to clinic with left shoulder pain, patient states he has had pain for a month and pain is getting worse where he cannot sleep at night. Patient states pain is radiating to shoulder blade and neck  .Rosana Sykes LPN on 11/8/2022 at 11:59 AM

## 2022-11-08 NOTE — PROGRESS NOTES
ASSESSMENT/PLAN:    I have reviewed the nursing notes.  I have reviewed the findings, diagnosis, plan and need for follow up with the patient.    1. Acute pain of left shoulder  - XR Shoulder Left G/E 3 Views  - Orthopedic  Referral; Future  - Physical Therapy Referral; Future  - cyclobenzaprine (FLEXERIL) 10 MG tablet; Take 1 tablet (10 mg) by mouth 3 times daily as needed for muscle spasms  Dispense: 15 tablet; Refill: 0  Reviewed normal shoulder xr with patient in office; was concerned about the joint space/ arthritis due to pain being severe specifically in the shoulder joint.  Refer to physical therapy and nonsurgical orthopedic referral was placed for further evaluation of acute, severe left shoulder pain without known injury.  He is having tense muscles in the left neck and shoulder area as well with pain but not particularly spasms, thus offered small prescription of Flexeril to see if this helps and promote sleep at night as he has been unable to do so due to this.  Continue Tylenol ibuprofen, he has not been using NSAIDs regularly at high enough doses to have been successful so I reviewed this with him as well recommend 600 to 800 mg approximately 3 times a day for pain for several days.  This is not contraindicated for this particular patient.  He verbalized understanding of the above information.    2. Muscle pain  - cyclobenzaprine (FLEXERIL) 10 MG tablet; Take 1 tablet (10 mg) by mouth 3 times daily as needed for muscle spasms  Dispense: 15 tablet; Refill: 0  -May use over-the-counter Tylenol or ibuprofen PRN    Discussed warning signs/symptoms indicative of need to f/u    Follow up if symptoms persist or worsen or concerns    I explained my diagnostic considerations and recommendations to the patient, who voiced understanding and agreement with the treatment plan. All questions were answered. We discussed potential side effects of any prescribed or recommended therapies, as well as  expectations for response to treatments.    Rukhsana Gutierrez NP  11/8/2022  12:25 PM    HPI:  Rangel Florez is a 42 year old male who presents to [facility] today for concerns of left shoulder pain, patient states he has had pain for a month and pain is getting worse where he cannot sleep at night for several days now. Patient states pain is radiating to shoulder blade and neck.     1 month of manageble pain.   Aggravated after hunting on Saturday, 8/10, now its creeping into the neck. Pinched on shoulder blade. No known injuruy prior ot onset of pain.     Fore[employer] for work; right arm dominant. No parasthesia into hand/arm or neck.   Neck sore.   Has had back injury in past.     Not sleeping due to pain.   Alleviating when the elbow is behind his shoulder-   No relief from ibuprofen and ice has not been tried, not using nsaids consistently.         Past Medical History:   Diagnosis Date     Medical history reviewed with no changes      Past Surgical History:   Procedure Laterality Date     HC TOOTH EXTRACTION W/FORCEP       Social History     Tobacco Use     Smoking status: Every Day     Packs/day: 1.00     Years: 20.00     Pack years: 20.00     Types: Cigarettes     Smokeless tobacco: Never   Substance Use Topics     Alcohol use: Yes     Comment: 6 beer a week      Current Outpatient Medications   Medication Sig Dispense Refill     cyclobenzaprine (FLEXERIL) 10 MG tablet Take 1 tablet (10 mg) by mouth 3 times daily as needed for muscle spasms 15 tablet 0     bisacodyl (DULCOLAX) 5 MG EC tablet Take two tablets at noon day prior to colonoscopy (Patient not taking: Reported on 6/3/2022) 2 tablet 0     polyethylene glycol-electrolytes (NULYTELY WITH FLAVOR PACKS) 420 g solution Take 250 mL every 10 minutes the day prior to colonoscopy (Patient not taking: Reported on 6/3/2022) 4000 mL 0     No Known Allergies  Past medical history, past surgical history, current medications and allergies reviewed and accurate to  the best of my knowledge.      ROS:  Refer to HPI    /82   Pulse 63   Temp 99.6  F (37.6  C) (Tympanic)   Wt 79.7 kg (175 lb 11.2 oz)   SpO2 98%   BMI 23.83 kg/m      EXAM:  General Appearance: Well appearing 42 year old male, appropriate appearance for age. No acute distress   Respiratory: normal chest wall and respirations.  Normal effort.  Clear to auscultation bilaterally, no wheezing, crackles or rhonchi.  No increased work of breathing.  No cough appreciated.  Cardiac: RRR with no murmurs  Musculoskeletal:  Equal movement of bilateral upper extremities.  Equal movement of bilateral lower extremities.  Normal gait.    LEFT SHOULDER PHYSICAL EXAMINATION:  Gross Examination: shoulders appear symmetrical in appearance, no signs of bony deformity over the AC, clavicle or glenohumeral joints. No signs of previous or current trauma. No signs of ecchymosis. Skin is intact.     Palpation: Tenderness to palpation: AC joint, acromion and upper trapezius region    ROM: flexion full (painful), extension full (alleviating), abduction full, adduction full, IR full, ER full       Shoulder Strength: full against resistance 5/5         Instability:    Apprehension: negative    Cross Over: negative     Impingement:      Empty Can (supraspinatus): negative    Neurovascular status: distal pulses are intact and are 2+. Two point discrimination intact. Distal capillary refill intact < 3 seconds.     Psychological: normal affect, alert, oriented, and pleasant.     Results for orders placed or performed in visit on 11/08/22   XR Shoulder Left G/E 3 Views     Status: None    Narrative    PROCEDURE:  XR SHOULDER LEFT G/E 3 VIEWS    HISTORY: left shoulder pain worsening in past few days, ongoing x1  month rated up to 8+/10 without known injury.; Acute pain of left  shoulder    COMPARISON:  None.    TECHNIQUE:  3 views of the left shoulder were obtained.    FINDINGS:  No fracture or dislocation is identified. The joint  spaces  are preserved.        Impression    IMPRESSION: Normal left shoulder      DEEPA FLOOD MD         SYSTEM ID:  [id number]

## 2022-11-08 NOTE — PATIENT INSTRUCTIONS
NSAIDS recommend 600 mg of ibuprofen every 6-8 hours for pain, may alternate tylenol 1000 mg every 6-8 hours     An xr was performed today, will be in touch with these results.     PT referral and ortho referral placed.

## 2022-12-05 ENCOUNTER — TRANSFERRED RECORDS (OUTPATIENT)
Dept: HEALTH INFORMATION MANAGEMENT | Facility: OTHER | Age: 42
End: 2022-12-05

## 2023-05-06 ENCOUNTER — HEALTH MAINTENANCE LETTER (OUTPATIENT)
Age: 43
End: 2023-05-06

## 2023-06-23 ENCOUNTER — OFFICE VISIT (OUTPATIENT)
Dept: FAMILY MEDICINE | Facility: OTHER | Age: 43
End: 2023-06-23
Payer: COMMERCIAL

## 2023-06-23 VITALS
TEMPERATURE: 98.6 F | BODY MASS INDEX: 23.38 KG/M2 | OXYGEN SATURATION: 98 % | RESPIRATION RATE: 16 BRPM | SYSTOLIC BLOOD PRESSURE: 126 MMHG | DIASTOLIC BLOOD PRESSURE: 84 MMHG | HEART RATE: 78 BPM | WEIGHT: 172.4 LBS

## 2023-06-23 DIAGNOSIS — S40.261A TICK BITE OF RIGHT SHOULDER, INITIAL ENCOUNTER: Primary | ICD-10-CM
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
## 2023-06-23 PROCEDURE — 99213 OFFICE O/P EST LOW 20 MIN: CPT

## 2023-06-23 RX ORDER — DOXYCYCLINE 100 MG/1
200 CAPSULE ORAL ONCE
Qty: 2 CAPSULE | Refills: 0 | Status: SHIPPED | OUTPATIENT
Start: 2023-06-23 | End: 2023-06-23

## 2023-06-23 ASSESSMENT — PAIN SCALES - GENERAL: PAINLEVEL: NO PAIN (0)

## 2023-06-23 NOTE — NURSING NOTE
Chief Complaint   Patient presents with     Insect Bites       FOOD SECURITY SCREENING QUESTIONS  Hunger Vital Signs:  Within the past 12 months we worried whether our food would run out before we got money to buy more. Never  Within the past 12 months the food we bought just didn't last and we didn't have money to get more. Never  Unique Mas LPN 6/23/2023 2:22 PM      Initial /84 (BP Location: Right arm, Patient Position: Sitting, Cuff Size: Adult Regular)   Pulse 78   Temp 98.6  F (37  C) (Tympanic)   Resp 16   Wt 78.2 kg (172 lb 6.4 oz)   SpO2 98%   BMI 23.38 kg/m   Estimated body mass index is 23.38 kg/m  as calculated from the following:    Height as of 1/12/22: 1.829 m (6').    Weight as of this encounter: 78.2 kg (172 lb 6.4 oz).  Medication Reconciliation: complete    Unique Mas LPN

## 2023-06-23 NOTE — PATIENT INSTRUCTIONS
You have been prescribed an antibiotic called doxycycline.  I recommend you take this antibiotic as ordered.  This antibiotic may cause some photosensitivity and you will be more sensitive to sunlight and sunburn.  I recommend good sunscreen use while on this medication.  I recommend you avoid taking this medicine 2 hours before and 2 hours after any foods high in calcium such as dairy or any calcium, magnesium, or iron supplements.  Follow-up is recommended if symptoms are worsening or you have any concerns.

## 2023-06-23 NOTE — PROGRESS NOTES
ASSESSMENT/PLAN:    (S40.261A,  W57.XXXA) Tick bite of right shoulder, initial encounter  (primary encounter diagnosis)  Comment: Patient presents with concerns of a deer tick that he removed from his right shoulder today.  He is unsure how long it was attached for.  He works for the [employer] and has many tick exposures.  On exam right shoulder with small erythematous lesion consistent with a tick bite.  No erythema migrans.  We will initiate postexposure prophylaxis at this time.  Plan: doxycycline hyclate (VIBRAMYCIN) 100 MG capsule  You have been prescribed an antibiotic called doxycycline.  I recommend you take this antibiotic as ordered.  This antibiotic may cause some photosensitivity and you will be more sensitive to sunlight and sunburn.  I recommend good sunscreen use while on this medication.  I recommend you avoid taking this medicine 2 hours before and 2 hours after any foods high in calcium such as dairy or any calcium, magnesium, or iron supplements.  Follow-up is recommended if symptoms are worsening or you have any concerns.    Discussed warning signs/symptoms indicative of need to f/u    Follow up if symptoms persist or worsen or concerns    I have reviewed the nursing notes.  I have reviewed the findings, diagnosis, plan and need for follow up with the patient.    I explained my diagnostic considerations and recommendations to the patient, who voiced understanding and agreement with the treatment plan. All questions were answered. We discussed potential side effects of any prescribed or recommended therapies, as well as expectations for response to treatments.    CARLEE JAVIER CNP  6/23/2023  2:28 PM    HPI:    Rangel Florez is a 42 year old male  who presents to [facility] today for concerns of deer tick to right shoulder.    Patient reports that he removed a deer tick from his right shoulder today.  He is unsure how long it was attached for.  He notes it was a deer tick.  He works for  the [employer] and is in the woods a lot and has many exposures.    No known medication allergies.     PCP: April.     Past Medical History:   Diagnosis Date     Medical history reviewed with no changes      Past Surgical History:   Procedure Laterality Date     HC TOOTH EXTRACTION W/FORCEP       Social History     Tobacco Use     Smoking status: Every Day     Packs/day: 1.00     Years: 20.00     Pack years: 20.00     Types: Cigarettes     Smokeless tobacco: Never   Substance Use Topics     Alcohol use: Yes     Comment: 6 beer a week      Current Outpatient Medications   Medication Sig Dispense Refill     bisacodyl (DULCOLAX) 5 MG EC tablet Take two tablets at noon day prior to colonoscopy (Patient not taking: Reported on 6/3/2022) 2 tablet 0     polyethylene glycol-electrolytes (NULYTELY WITH FLAVOR PACKS) 420 g solution Take 250 mL every 10 minutes the day prior to colonoscopy (Patient not taking: Reported on 6/3/2022) 4000 mL 0     No Known Allergies  Past medical history, past surgical history, current medications and allergies reviewed and accurate to the best of my knowledge.      ROS:  Refer to HPI    /84 (BP Location: Right arm, Patient Position: Sitting, Cuff Size: Adult Regular)   Pulse 78   Temp 98.6  F (37  C) (Tympanic)   Resp 16   Wt 78.2 kg (172 lb 6.4 oz)   SpO2 98%   BMI 23.38 kg/m      EXAM:  General Appearance: Well appearing 42 year old male, appropriate appearance for age. No acute distress   Eyes: conjunctivae normal without erythema or irritation, corneas clear, no drainage or crusting, no eyelid swelling, pupils equal   Oropharynx: moist mucous membranes,  voice clear.    Nose:  Bilateral nares: no erythema, no edema, no drainage or congestion   Neck: supple   Respiratory: normal chest wall and respirations.  Normal effort.  No increased work of breathing.  No cough appreciated.  Musculoskeletal:  Equal movement of bilateral upper extremities.  Equal movement of bilateral lower  extremities.  Normal gait.    Dermatological: Right shoulder with small erythematous lesion.  No erythema migrans.  Neuro: Alert and oriented to person, place, and time.  Cranial nerves II-XII grossly intact with no focal or lateralizing deficits.  Muscle tone normal.  Gait normal. No tremor.   Psychological: normal affect, alert, oriented, and pleasant.

## 2024-07-13 ENCOUNTER — HEALTH MAINTENANCE LETTER (OUTPATIENT)
Age: 44
End: 2024-07-13

## 2024-08-28 ENCOUNTER — OFFICE VISIT (OUTPATIENT)
Dept: FAMILY MEDICINE | Facility: OTHER | Age: 44
End: 2024-08-28
Attending: NURSE PRACTITIONER
Payer: COMMERCIAL

## 2024-08-28 VITALS
SYSTOLIC BLOOD PRESSURE: 118 MMHG | RESPIRATION RATE: 16 BRPM | TEMPERATURE: 98.3 F | DIASTOLIC BLOOD PRESSURE: 82 MMHG | HEART RATE: 104 BPM | WEIGHT: 180 LBS | OXYGEN SATURATION: 99 % | BODY MASS INDEX: 24.41 KG/M2
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
## 2024-08-28 DIAGNOSIS — S91.332A PUNCTURE WOUND OF LEFT FOOT, INITIAL ENCOUNTER: Primary | ICD-10-CM

## 2024-08-28 PROCEDURE — 90715 TDAP VACCINE 7 YRS/> IM: CPT | Performed by: NURSE PRACTITIONER

## 2024-08-28 PROCEDURE — 99212 OFFICE O/P EST SF 10 MIN: CPT | Mod: 25 | Performed by: NURSE PRACTITIONER

## 2024-08-28 PROCEDURE — 90471 IMMUNIZATION ADMIN: CPT | Performed by: NURSE PRACTITIONER

## 2024-08-28 ASSESSMENT — ENCOUNTER SYMPTOMS
GASTROINTESTINAL NEGATIVE: 1
CONSTITUTIONAL NEGATIVE: 1
HEMATOLOGIC/LYMPHATIC NEGATIVE: 1
ALLERGIC/IMMUNOLOGIC NEGATIVE: 1
RESPIRATORY NEGATIVE: 1
CARDIOVASCULAR NEGATIVE: 1
ENDOCRINE NEGATIVE: 1
PSYCHIATRIC NEGATIVE: 1
WOUND: 1
EYES NEGATIVE: 1
MUSCULOSKELETAL NEGATIVE: 1
NEUROLOGICAL NEGATIVE: 1

## 2024-08-28 ASSESSMENT — PAIN SCALES - GENERAL: PAINLEVEL: MILD PAIN (2)

## 2024-08-28 NOTE — PROGRESS NOTES
Rangel Florez  1980    ASSESSMENT/PLAN      [facility] in left foot from a nomi nail that happened last night.  Patient did clean this area last night and during the injury a skin flap had been noted.  Patient states he placed a skin flap down and this has now healed closed.  Patient has no sign of secondary infection at this time.  Will update tetanus today.  Monitor for signs of infection and follow-up as needed. Patient's vitals are stable and he appears nontoxic.      1. Puncture wound of left foot, initial encounter  2. Wound from metal nail  3. Injury by nail, initial encounter    - TDaP today  - Foot around is clean dry intact  - Monitor for signs of infection, redness, drainage, swelling  - May use over-the-counter Tylenol or ibuprofen PRN  - Follow up as needed for new or worsening symptoms          *Explanation of diagnosis, treatment options and risk and benefits of medications reviewed with patient. Patient agrees with plan of care.  *All questions were answered.    *Red flags symptoms were discussed and patient was advised when they should return for reevaluation or for prompt emergency evaluation.   *Patient was given verbal and written instructions on plan of care. Instructions were printed or are available on Mychart on electronic AVS.   *We discussed potential side effects of any prescribed or recommended therapies, as well as expectations for response to treatments.  *Patient discharged in stable condition    Erica Herbert CNP  [facility]    SUBJECTIVE  CHIEF COMPLAINT/ REASON FOR VISIT  Patient presents with:  Puncture Wound: Stepped on nail last night     HISTORY OF PRESENT ILLNESS  Rangel Florez is a pleasant 44 year old male [facility] with complaints of stepping on a nomi nail last night.  This punctured the base of his left foot.  There was a skin tear, laceration which he placed down on his skin after  cleansing of the wound.  It has now healed over.  There is no drainage noted currently.  No redness or bruising.    I have reviewed the nursing notes.  I have reviewed allergies, medication list, problem list, and past medical history.    REVIEW OF SYSTEMS  Review of Systems   Constitutional: Negative.    HENT: Negative.     Eyes: Negative.    Respiratory: Negative.     Cardiovascular: Negative.    Gastrointestinal: Negative.    Endocrine: Negative.    Genitourinary: Negative.    Musculoskeletal: Negative.    Skin:  Positive for wound.        Stepped on a nomi nail with left foot   Allergic/Immunologic: Negative.    Neurological: Negative.    Hematological: Negative.    Psychiatric/Behavioral: Negative.     All other systems reviewed and are negative.       VITAL SIGNS  Vitals:    08/28/24 1434   BP: 118/82   BP Location: Right arm   Patient Position: Sitting   Cuff Size: Adult Regular   Pulse: 104   Resp: 16   Temp: 98.3  F (36.8  C)   TempSrc: Temporal   SpO2: 99%   Weight: 81.6 kg (180 lb)      Body mass index is 24.41 kg/m .      OBJECTIVE  PHYSICAL EXAM  Physical Exam  Vitals and nursing note reviewed.   Constitutional:       Appearance: Normal appearance.   HENT:      Head: Normocephalic.      Nose: Nose normal.      Mouth/Throat:      Mouth: Mucous membranes are moist.   Eyes:      Pupils: Pupils are equal, round, and reactive to light.   Cardiovascular:      Rate and Rhythm: Normal rate and regular rhythm.      Pulses: Normal pulses.      Heart sounds: Normal heart sounds.   Pulmonary:      Effort: Pulmonary effort is normal.      Breath sounds: Normal breath sounds.   Abdominal:      General: Bowel sounds are normal.   Musculoskeletal:         General: Tenderness and signs of injury present.      Cervical back: Normal range of motion.      Comments: Noted puncture wound on left foot, avulsed skin area with skin flap closed and clean dry and intact.  No redness, drainage or signs of infection.   Skin:      General: Skin is warm and dry.      Capillary Refill: Capillary refill takes less than 2 seconds.   Neurological:      General: No focal deficit present.      Mental Status: He is alert.

## 2024-10-24 ENCOUNTER — OFFICE VISIT (OUTPATIENT)
Dept: FAMILY MEDICINE | Facility: OTHER | Age: 44
End: 2024-10-24
Attending: NURSE PRACTITIONER
Payer: OTHER MISCELLANEOUS

## 2024-10-24 VITALS
TEMPERATURE: 98.4 F | RESPIRATION RATE: 20 BRPM | HEART RATE: 57 BPM | BODY MASS INDEX: 23.78 KG/M2 | HEIGHT: 73 IN | SYSTOLIC BLOOD PRESSURE: 122 MMHG | WEIGHT: 179.4 LBS | OXYGEN SATURATION: 100 % | DIASTOLIC BLOOD PRESSURE: 89 MMHG

## 2024-10-24 DIAGNOSIS — L23.7 CONTACT DERMATITIS DUE TO POISON IVY: Primary | ICD-10-CM

## 2024-10-24 PROCEDURE — 99213 OFFICE O/P EST LOW 20 MIN: CPT | Performed by: NURSE PRACTITIONER

## 2024-10-24 RX ORDER — TRIAMCINOLONE ACETONIDE 1 MG/G
CREAM TOPICAL 2 TIMES DAILY
Qty: 30 G | Refills: 1 | Status: SHIPPED | OUTPATIENT
Start: 2024-10-24

## 2024-10-24 RX ORDER — PREDNISONE 20 MG/1
TABLET ORAL
Qty: 20 TABLET | Refills: 0 | Status: SHIPPED | OUTPATIENT
Start: 2024-10-24

## 2024-10-24 RX ORDER — PREDNISONE 20 MG/1
TABLET ORAL
Qty: 20 TABLET | Refills: 0 | Status: SHIPPED | OUTPATIENT
Start: 2024-10-24 | End: 2024-10-24

## 2024-10-24 RX ORDER — TRIAMCINOLONE ACETONIDE 1 MG/G
CREAM TOPICAL 2 TIMES DAILY
Qty: 30 G | Refills: 1 | Status: SHIPPED | OUTPATIENT
Start: 2024-10-24 | End: 2024-10-24

## 2024-10-24 ASSESSMENT — ENCOUNTER SYMPTOMS
NEUROLOGICAL NEGATIVE: 1
CONSTITUTIONAL NEGATIVE: 1
PSYCHIATRIC NEGATIVE: 1
MUSCULOSKELETAL NEGATIVE: 1
HEMATOLOGIC/LYMPHATIC NEGATIVE: 1
CARDIOVASCULAR NEGATIVE: 1
GASTROINTESTINAL NEGATIVE: 1
RESPIRATORY NEGATIVE: 1

## 2024-10-24 ASSESSMENT — PAIN SCALES - GENERAL: PAINLEVEL_OUTOF10: NO PAIN (0)

## 2024-10-24 NOTE — NURSING NOTE
"Chief Complaint   Patient presents with    Poison Edwige     Patient presents to the [facility] today for WC poison ivy he got on Saturday 10/19.      Initial /89 (BP Location: Right arm, Patient Position: Sitting, Cuff Size: Adult Regular)   Pulse 57   Temp 98.4  F (36.9  C) (Tympanic)   Resp 20   Ht 1.854 m (6' 1\")   Wt 81.4 kg (179 lb 6.4 oz)   SpO2 100%   BMI 23.67 kg/m   Estimated body mass index is 23.67 kg/m  as calculated from the following:    Height as of this encounter: 1.854 m (6' 1\").    Weight as of this encounter: 81.4 kg (179 lb 6.4 oz).     FOOD SECURITY SCREENING QUESTIONS:    The next two questions are to help us understand your food security.  If you are feeling you need any assistance in this area, we have resources available to support you today.    Hunger Vital Signs:  Within the past 12 months we worried whether our food would run out before we got money to buy more. Never  Within the past 12 months the food we bought just didn't last and we didn't have money to get more. Never      Kelsi Ramirez     "

## 2024-10-24 NOTE — PROGRESS NOTES
INITIAL WORK COMP VISIT    Rangel Florez  1980    ASSESSMENT/PLAN    Presents to [facility] with poison ivy rash, history of reactive skin rash to poison ivy.  Patient was at work this past weekend and exposed on 10/19/2024.  Patient has been trying home remedies without benefit and rash is now spreading. Patient's vitals are stable and he appears nontoxic.        1. Contact dermatitis due to poison ivy (Primary)    - predniSONE (DELTASONE) 20 MG tablet; Take 3 tabs by mouth daily x 3 days, then 2 tabs daily x 3 days, then 1 tab daily x 3 days, then 1/2 tab daily x 3 days.  Dispense: 20 tablet; Refill: 0  - triamcinolone (KENALOG) 0.1 % external cream; Apply topically 2 times daily.  Dispense: 30 g; Refill: 1  - Patient may return to work without restrictions  - May use over-the-counter Tylenol or ibuprofen PRN  - Follow up as needed for new or worsening symptoms          *Explanation of diagnosis, treatment options and risk and benefits of medications reviewed with patient. Patient agrees with plan of care.  *All questions were answered.    *Red flags symptoms were discussed and patient was advised when they should return for reevaluation or for prompt emergency evaluation.   *Patient was given verbal and written instructions on plan of care. Instructions were printed or are available on [email or web address] on electronic AVS.   *We discussed potential side effects of any prescribed or recommended therapies, as well as expectations for response to treatments.  *Patient discharged in stable condition    Erica Herbert CNP  [facility]    SUBJECTIVE  CHIEF COMPLAINT/ REASON FOR VISIT  Patient presents with:  Poison Ivy     HISTORY OF PRESENT ILLNESS  Rangel Florez is a pleasant 44 year old male presents to [facility] today with poison ivy rash.  Patient was exposed while at work, is a  and works in the woods.  Patient has history of reactive skin to poison ivy, has at least 5  "outbreaks per year.  Patient normally can treat this at home but has had increased rash on his right arm moving to his shoulder.  Patient has a pruritic skin all over his body.     I have reviewed the nursing notes.  I have reviewed allergies, medication list, problem list, and past medical history.    REVIEW OF SYSTEMS  Review of Systems   Constitutional: Negative.    HENT: Negative.     Respiratory: Negative.     Cardiovascular: Negative.    Gastrointestinal: Negative.    Genitourinary: Negative.    Musculoskeletal: Negative.    Skin:  Positive for rash.   Neurological: Negative.    Hematological: Negative.    Psychiatric/Behavioral: Negative.     All other systems reviewed and are negative.       VITAL SIGNS  Vitals:    10/24/24 0915   BP: 122/89   BP Location: Right arm   Patient Position: Sitting   Cuff Size: Adult Regular   Pulse: 57   Resp: 20   Temp: 98.4  F (36.9  C)   TempSrc: Tympanic   SpO2: 100%   Weight: 81.4 kg (179 lb 6.4 oz)   Height: 1.854 m (6' 1\")      Body mass index is 23.67 kg/m .      OBJECTIVE  PHYSICAL EXAM  Physical Exam  Vitals and nursing note reviewed.   Constitutional:       Appearance: Normal appearance.   HENT:      Head: Normocephalic.      Nose: Nose normal.      Mouth/Throat:      Mouth: Mucous membranes are moist.   Eyes:      Pupils: Pupils are equal, round, and reactive to light.   Cardiovascular:      Rate and Rhythm: Normal rate.      Pulses: Normal pulses.   Pulmonary:      Effort: Pulmonary effort is normal.   Abdominal:      General: Bowel sounds are normal.   Musculoskeletal:         General: Normal range of motion.      Cervical back: Normal range of motion.   Skin:     General: Skin is warm and dry.      Capillary Refill: Capillary refill takes less than 2 seconds.      Findings: Rash present.      Comments: Red, raised rash on right forearm moving into right elbow.    Neurological:      General: No focal deficit present.      Mental Status: He is alert.              "

## 2025-07-19 ENCOUNTER — HEALTH MAINTENANCE LETTER (OUTPATIENT)
Age: 45
End: 2025-07-19